# Patient Record
Sex: FEMALE | Race: BLACK OR AFRICAN AMERICAN | NOT HISPANIC OR LATINO | Employment: OTHER
[De-identification: names, ages, dates, MRNs, and addresses within clinical notes are randomized per-mention and may not be internally consistent; named-entity substitution may affect disease eponyms.]

---

## 2017-09-17 ENCOUNTER — HEALTH MAINTENANCE LETTER (OUTPATIENT)
Age: 27
End: 2017-09-17

## 2022-04-28 ENCOUNTER — TRANSFERRED RECORDS (OUTPATIENT)
Dept: MULTI SPECIALTY CLINIC | Facility: CLINIC | Age: 32
End: 2022-04-28

## 2022-04-28 LAB
HEP C HIM: NORMAL
HIV 1&2 EXT: NORMAL

## 2023-04-17 ENCOUNTER — TRANSFERRED RECORDS (OUTPATIENT)
Dept: MULTI SPECIALTY CLINIC | Facility: CLINIC | Age: 33
End: 2023-04-17

## 2023-04-17 LAB
HPV ABSTRACT: NORMAL
PAP-ABSTRACT: NORMAL

## 2023-04-19 ENCOUNTER — APPOINTMENT (OUTPATIENT)
Dept: GENERAL RADIOLOGY | Facility: CLINIC | Age: 33
End: 2023-04-19
Attending: EMERGENCY MEDICINE
Payer: COMMERCIAL

## 2023-04-19 ENCOUNTER — HOSPITAL ENCOUNTER (EMERGENCY)
Facility: CLINIC | Age: 33
Discharge: HOME | End: 2023-04-19
Attending: EMERGENCY MEDICINE | Admitting: EMERGENCY MEDICINE
Payer: COMMERCIAL

## 2023-04-19 VITALS
DIASTOLIC BLOOD PRESSURE: 63 MMHG | SYSTOLIC BLOOD PRESSURE: 120 MMHG | TEMPERATURE: 97.7 F | RESPIRATION RATE: 18 BRPM | HEART RATE: 85 BPM | OXYGEN SATURATION: 100 %

## 2023-04-19 DIAGNOSIS — R05.9 COUGH, UNSPECIFIED TYPE: ICD-10-CM

## 2023-04-19 LAB
ANION GAP SERPL CALCULATED.3IONS-SCNC: 12 MMOL/L (ref 7–15)
ATRIAL RATE - MUSE: 67 BPM
BASOPHILS # BLD AUTO: 0 10E3/UL (ref 0–0.2)
BASOPHILS NFR BLD AUTO: 0 %
BUN SERPL-MCNC: 9.7 MG/DL (ref 6–20)
CALCIUM SERPL-MCNC: 9.1 MG/DL (ref 8.6–10)
CHLORIDE SERPL-SCNC: 100 MMOL/L (ref 98–107)
CREAT SERPL-MCNC: 0.42 MG/DL (ref 0.51–0.95)
D DIMER PPP FEU-MCNC: <0.27 UG/ML FEU (ref 0–0.5)
DEPRECATED HCO3 PLAS-SCNC: 23 MMOL/L (ref 22–29)
DIASTOLIC BLOOD PRESSURE - MUSE: NORMAL MMHG
EOSINOPHIL # BLD AUTO: 0 10E3/UL (ref 0–0.7)
EOSINOPHIL NFR BLD AUTO: 0 %
ERYTHROCYTE [DISTWIDTH] IN BLOOD BY AUTOMATED COUNT: 14.1 % (ref 10–15)
FLUAV RNA SPEC QL NAA+PROBE: NEGATIVE
FLUBV RNA RESP QL NAA+PROBE: NEGATIVE
GFR SERPL CREATININE-BSD FRML MDRD: >90 ML/MIN/1.73M2
GLUCOSE SERPL-MCNC: 104 MG/DL (ref 70–99)
HCG INTACT+B SERPL-ACNC: <1 MIU/ML
HCT VFR BLD AUTO: 40.4 % (ref 35–47)
HGB BLD-MCNC: 13.2 G/DL (ref 11.7–15.7)
IMM GRANULOCYTES # BLD: 0.1 10E3/UL
IMM GRANULOCYTES NFR BLD: 1 %
INTERPRETATION ECG - MUSE: NORMAL
LYMPHOCYTES # BLD AUTO: 4.2 10E3/UL (ref 0.8–5.3)
LYMPHOCYTES NFR BLD AUTO: 36 %
MCH RBC QN AUTO: 29.8 PG (ref 26.5–33)
MCHC RBC AUTO-ENTMCNC: 32.7 G/DL (ref 31.5–36.5)
MCV RBC AUTO: 91 FL (ref 78–100)
MONOCYTES # BLD AUTO: 0.5 10E3/UL (ref 0–1.3)
MONOCYTES NFR BLD AUTO: 5 %
NEUTROPHILS # BLD AUTO: 6.7 10E3/UL (ref 1.6–8.3)
NEUTROPHILS NFR BLD AUTO: 58 %
NRBC # BLD AUTO: 0 10E3/UL
NRBC BLD AUTO-RTO: 0 /100
P AXIS - MUSE: 45 DEGREES
PLASMODIUM AG BLD QL IA: NEGATIVE
PLASMODIUM AG BLD QL IA: NEGATIVE
PLATELET # BLD AUTO: 262 10E3/UL (ref 150–450)
POTASSIUM SERPL-SCNC: 4.4 MMOL/L (ref 3.4–5.3)
PR INTERVAL - MUSE: 198 MS
QRS DURATION - MUSE: 80 MS
QT - MUSE: 390 MS
QTC - MUSE: 412 MS
R AXIS - MUSE: 41 DEGREES
RBC # BLD AUTO: 4.43 10E6/UL (ref 3.8–5.2)
RSV RNA SPEC NAA+PROBE: NEGATIVE
SARS-COV-2 RNA RESP QL NAA+PROBE: NEGATIVE
SODIUM SERPL-SCNC: 135 MMOL/L (ref 136–145)
SYSTOLIC BLOOD PRESSURE - MUSE: NORMAL MMHG
T AXIS - MUSE: 40 DEGREES
TROPONIN T SERPL HS-MCNC: <6 NG/L
VENTRICULAR RATE- MUSE: 67 BPM
WBC # BLD AUTO: 11.6 10E3/UL (ref 4–11)

## 2023-04-19 PROCEDURE — 85379 FIBRIN DEGRADATION QUANT: CPT | Performed by: EMERGENCY MEDICINE

## 2023-04-19 PROCEDURE — 85025 COMPLETE CBC W/AUTO DIFF WBC: CPT | Performed by: EMERGENCY MEDICINE

## 2023-04-19 PROCEDURE — C9803 HOPD COVID-19 SPEC COLLECT: HCPCS | Performed by: EMERGENCY MEDICINE

## 2023-04-19 PROCEDURE — 99284 EMERGENCY DEPT VISIT MOD MDM: CPT | Mod: CS | Performed by: EMERGENCY MEDICINE

## 2023-04-19 PROCEDURE — 80048 BASIC METABOLIC PNL TOTAL CA: CPT | Performed by: EMERGENCY MEDICINE

## 2023-04-19 PROCEDURE — 99285 EMERGENCY DEPT VISIT HI MDM: CPT | Mod: CS,25 | Performed by: EMERGENCY MEDICINE

## 2023-04-19 PROCEDURE — 87899 AGENT NOS ASSAY W/OPTIC: CPT | Performed by: EMERGENCY MEDICINE

## 2023-04-19 PROCEDURE — 84702 CHORIONIC GONADOTROPIN TEST: CPT | Performed by: EMERGENCY MEDICINE

## 2023-04-19 PROCEDURE — 93010 ELECTROCARDIOGRAM REPORT: CPT | Performed by: EMERGENCY MEDICINE

## 2023-04-19 PROCEDURE — 36415 COLL VENOUS BLD VENIPUNCTURE: CPT | Performed by: EMERGENCY MEDICINE

## 2023-04-19 PROCEDURE — 93005 ELECTROCARDIOGRAM TRACING: CPT | Performed by: EMERGENCY MEDICINE

## 2023-04-19 PROCEDURE — 87207 SMEAR SPECIAL STAIN: CPT | Performed by: EMERGENCY MEDICINE

## 2023-04-19 PROCEDURE — 84484 ASSAY OF TROPONIN QUANT: CPT | Performed by: EMERGENCY MEDICINE

## 2023-04-19 PROCEDURE — 71046 X-RAY EXAM CHEST 2 VIEWS: CPT

## 2023-04-19 PROCEDURE — 87015 SPECIMEN INFECT AGNT CONCNTJ: CPT | Performed by: EMERGENCY MEDICINE

## 2023-04-19 PROCEDURE — 87637 SARSCOV2&INF A&B&RSV AMP PRB: CPT | Performed by: EMERGENCY MEDICINE

## 2023-04-19 RX ORDER — IBUPROFEN 600 MG/1
600 TABLET, FILM COATED ORAL EVERY 6 HOURS PRN
Qty: 30 TABLET | Refills: 0 | Status: SHIPPED | OUTPATIENT
Start: 2023-04-19 | End: 2023-08-11

## 2023-04-19 RX ORDER — BENZONATATE 200 MG/1
200 CAPSULE ORAL 3 TIMES DAILY PRN
Qty: 20 CAPSULE | Refills: 0 | Status: SHIPPED | OUTPATIENT
Start: 2023-04-19 | End: 2023-08-11 | Stop reason: ALTCHOICE

## 2023-04-19 ASSESSMENT — ACTIVITIES OF DAILY LIVING (ADL)
ADLS_ACUITY_SCORE: 35
ADLS_ACUITY_SCORE: 35

## 2023-04-19 NOTE — ED TRIAGE NOTES
Triage Assessment     Row Name 04/19/23 0030       Triage Assessment (Adult)    Airway WDL X       Respiratory WDL    Respiratory WDL X;cough;all    Rhythm/Pattern, Respiratory shortness of breath    Cough Frequency frequent       Skin Circulation/Temperature WDL    Skin Circulation/Temperature WDL WDL       Cardiac WDL    Cardiac WDL chest pain       Chest Pain Assessment    Chest Pain Location epigastric       Peripheral/Neurovascular WDL    Peripheral Neurovascular WDL WDL       Cognitive/Neuro/Behavioral WDL    Cognitive/Neuro/Behavioral WDL WDL

## 2023-04-19 NOTE — ED NOTES
Pt given discharge paperwork and instructions. No questions or concerns at this time. Ambulatory with steady gait. VSS. A&O x4

## 2023-04-19 NOTE — ED PROVIDER NOTES
ED Provider Note  [facility]      History     Chief Complaint   Patient presents with     Chest Pain     Pt is having 9/10 chest pain that been going on for 3  weeks gotten worse this week.       Cough     Fever     HPI:  Chrissy Hernandez is a 33 year old female with PMH including PE not on AC and mild chronic anemia who presents with worsening cough, chest pain, and shortness of breath. She states that her symptoms started while she was in [place] (-3/15), she thinks she contracted something while there. She did have mosquitos bites while there. Since then, has had worsening non-productive cough and associated chest pain and shortness of breath over the past 3-5 days. Her chest pain is generalized, sharp, constant and radiates to back, sides, and epigastric area. Worsens with coughing. Her shortness of breath is when she is coughing. Was seen in clinic on , tested negative for COVID and GAS at that time and was started on Albuterol. Again seen in clinic on  and was started on Symbicort. Notes she was prescribed a course of antibiotics (unsure name) starting on the . Her symptoms have not improved with the inhalers or antibiotics. Appetite has been low. Endorses subjective fevers, chills, pharyngitis, congestion, rhinorrhea, myalgias, HA, vomiting. Denies sweats, hemoptysis, diarrhea, constipation, dysuria, peripheral edema, rashes, eye pain, arthralgias. No known sick contacts.    Past Medical History  Pulmonary embolism (2018)  Mild chronic anemia   MDD    Past Surgical History    (2021)    Medications  Albuterol  Symbicort     No Known Allergies     Family History  No family history on file.     Social History   Non-smoker     A medically appropriate review of systems was performed with pertinent positives and negatives noted in the HPI, and all other systems negative.    Physical Exam   BP: 107/75  Pulse: 72  Temp: 98.4  F (36.9  C)  Resp: 18  SpO2: 100 %      Physical Exam  Constitutional:       Appearance: She is not diaphoretic.      Comments: Intermittently sleeping    HENT:      Head: Normocephalic and atraumatic.   Eyes:      Conjunctiva/sclera: Conjunctivae normal.      Pupils: Pupils are equal, round, and reactive to light.   Cardiovascular:      Rate and Rhythm: Normal rate and regular rhythm.      Heart sounds: Normal heart sounds. No murmur heard.  Pulmonary:      Effort: Pulmonary effort is normal. No respiratory distress.      Breath sounds: Normal breath sounds. No wheezing or rhonchi.   Abdominal:      General: Bowel sounds are normal. There is no distension.      Palpations: Abdomen is soft.      Tenderness: There is no abdominal tenderness.   Musculoskeletal:         General: No swelling or tenderness.      Cervical back: Neck supple. No tenderness.   Lymphadenopathy:      Cervical: No cervical adenopathy.   Skin:     Findings: No lesion or rash.   Neurological:      General: No focal deficit present.   Psychiatric:         Mood and Affect: Mood normal.         Behavior: Behavior normal.       ED Course, Procedures, & Data     Procedures            Results for orders placed or performed during the hospital encounter of 04/19/23   XR Chest 2 Views     Status: None    Narrative    EXAM: XR CHEST 2 VIEWS  LOCATION: [facility]  DATE/TIME: 4/19/2023 2:14 AM CDT    INDICATION: cough  COMPARISON: None.      Impression    IMPRESSION: Negative chest.   Basic metabolic panel     Status: Abnormal   Result Value Ref Range    Sodium 135 (L) 136 - 145 mmol/L    Potassium 4.4 3.4 - 5.3 mmol/L    Chloride 100 98 - 107 mmol/L    Carbon Dioxide (CO2) 23 22 - 29 mmol/L    Anion Gap 12 7 - 15 mmol/L    Urea Nitrogen 9.7 6.0 - 20.0 mg/dL    Creatinine 0.42 (L) 0.51 - 0.95 mg/dL    Calcium 9.1 8.6 - 10.0 mg/dL    Glucose 104 (H) 70 - 99 mg/dL    GFR Estimate >90 >60 mL/min/1.73m2   Symptomatic Influenza A/B, RSV, & SARS-CoV2  PCR (COVID-19) Nose     Status: Normal    Specimen: Nose; Swab   Result Value Ref Range    Influenza A PCR Negative Negative    Influenza B PCR Negative Negative    RSV PCR Negative Negative    SARS CoV2 PCR Negative Negative    Narrative    Testing was performed using the Xpert Xpress CoV2/Flu/RSV Assay on the [employer] GeneXpert Instrument. This test should be ordered for the detection of SARS-CoV-2, influenza, and RSV viruses in individuals who meet clinical and/or epidemiological criteria. Test performance is unknown in asymptomatic patients. This test is for in vitro diagnostic use under the FDA EUA for laboratories certified under CLIA to perform high or moderate complexity testing. This test has not been FDA cleared or approved. A negative result does not rule out the presence of PCR inhibitors in the specimen or target RNA in concentration below the limit of detection for the assay. If only one viral target is positive but coinfection with multiple targets is suspected, the sample should be re-tested with another FDA cleared, approved, or authorized test, if coinfection would change clinical management. This test was validated by the [facility]. These laboratories are certified under the Clinical Laboratory Improvement Amendments of 1988 (CLIA-88) as qualified to perform high complexity laboratory testing.   Troponin T, High Sensitivity     Status: Normal   Result Value Ref Range    Troponin T, High Sensitivity <6 <=14 ng/L   D dimer quantitative     Status: Normal   Result Value Ref Range    D-Dimer Quantitative <0.27 0.00 - 0.50 ug/mL FEU    Narrative    This D-dimer assay is intended for use in conjunction with a clinical pretest probability assessment model to exclude pulmonary embolism (PE) and deep venous thrombosis (DVT) in outpatients suspected of PE or DVT. The cut-off value is 0.50 ug/mL FEU.   HCG quantitative pregnancy     Status: Normal   Result Value Ref Range    hCG  Quantitative <1 <5 mIU/mL   CBC with platelets and differential     Status: Abnormal   Result Value Ref Range    WBC Count 11.6 (H) 4.0 - 11.0 10e3/uL    RBC Count 4.43 3.80 - 5.20 10e6/uL    Hemoglobin 13.2 11.7 - 15.7 g/dL    Hematocrit 40.4 35.0 - 47.0 %    MCV 91 78 - 100 fL    MCH 29.8 26.5 - 33.0 pg    MCHC 32.7 31.5 - 36.5 g/dL    RDW 14.1 10.0 - 15.0 %    Platelet Count 262 150 - 450 10e3/uL    % Neutrophils 58 %    % Lymphocytes 36 %    % Monocytes 5 %    % Eosinophils 0 %    % Basophils 0 %    % Immature Granulocytes 1 %    NRBCs per 100 WBC 0 <1 /100    Absolute Neutrophils 6.7 1.6 - 8.3 10e3/uL    Absolute Lymphocytes 4.2 0.8 - 5.3 10e3/uL    Absolute Monocytes 0.5 0.0 - 1.3 10e3/uL    Absolute Eosinophils 0.0 0.0 - 0.7 10e3/uL    Absolute Basophils 0.0 0.0 - 0.2 10e3/uL    Absolute Immature Granulocytes 0.1 <=0.4 10e3/uL    Absolute NRBCs 0.0 10e3/uL   EKG 12 lead     Status: None (Preliminary result)   Result Value Ref Range    Systolic Blood Pressure  mmHg    Diastolic Blood Pressure  mmHg    Ventricular Rate 67 BPM    Atrial Rate 67 BPM    GA Interval 198 ms    QRS Duration 80 ms     ms    QTc 412 ms    P Axis 45 degrees    R AXIS 41 degrees    T Axis 40 degrees    Interpretation ECG Sinus rhythm  Normal ECG      CBC with platelets differential     Status: Abnormal    Narrative    The following orders were created for panel order CBC with platelets differential.  Procedure                               Abnormality         Status                     ---------                               -----------         ------                     CBC with platelets and d...[[id number]]  Abnormal            Final result                 Please view results for these tests on the individual orders.     Medications - No data to display         Assessment & Plan    Chrissy Hernandez is a 33 year old female with PMH including PE not on AC and mild chronic anemia who presents with about 1 month of worsening cough,  subjective fevers, and generally feeling ill now associated with chest pain and shortness of breath for past 5 days. Vitals are stable, she is afebrile and satting appropriately on RA. Clinically, she appears ill and has not had improvement in her symptoms with bronchodilators or antibiotics. Differential includes malaria, arbovirus infection, viral URI, pneumonia, PE, ACS. Given her travel history and symptoms, am highly suspicious for malaria or other mosquito-borne infection. This could be viral URI, however would expect her symptoms to have started improving vs worsening by now. Also could have a pneumonia, however less likely given non-productive cough and normal lung exam. Low concern for PE at this time, given not hypoxic though will rule out given history and dyspnea. Also low concern for ACS given mostly subacute infectious symptoms and chest pain seems to be related to cough.     Work-up in ED included labs notable for normal D-dimer so do not suspect pulmonary embolism.  Normal EKG and troponins do not suspect ACS., CXR which was unremarkable for consolidation or infiltrates, and EKG which did not show evidence of acute ischemic changes.  COVID and influenza testing negative.  Malaria screen pending.    --    ED Attending Physician Attestation    I RANDAL ROWLEY MD, MD, cared for this patient with the Medical Student. I performed, or re-performed, the physical exam and medical decision-making. I have verified the accuracy of all the medical student findings and documentation above, and have edited as necessary.    Summary of HPI, PE, ED Course   Patient is a 33 year old female evaluated in the emergency department for 3-week history of cough now 5 days of chest pain . Exam and ED course notable for normal vital signs and exam.  Diagnostic evaluation with mild leukocytosis but no infiltrate on chest radiograph so do not suspect pneumonia.  COVID and influenza testing negative.  D-dimer normal so do not  suspect pulmonary embolism.  EKG and troponin normal so not suspect ACS.  Malaria screen pending.  After the completion of care in the emergency department, the patient was discharged.    Critical Care & Procedures  Not applicable.                DANIELE ROWLEY MD, MD  Emergency Medicine      Medical Decision Making  The patient's presentation was of moderate complexity (an undiagnosed new problem with uncertain diagnosis).    The patient's evaluation involved:  review of external note(s) from 2 sources (Recent urgent care and primary care visits)  ordering and/or review of 3+ test(s) in this encounter (see separate area of note for details)  review of 1 test result(s) ordered prior to this encounter (Recent COVID test result)    The patient's management necessitated moderate risk (prescription drug management including medications given in the ED).         I have reviewed the nursing notes. I have reviewed the findings, diagnosis, plan and need for follow up with the patient.    New Prescriptions    BENZONATATE (TESSALON) 200 MG CAPSULE    Take 1 capsule (200 mg) by mouth 3 times daily as needed for cough    IBUPROFEN (ADVIL/MOTRIN) 600 MG TABLET    Take 1 tablet (600 mg) by mouth every 6 hours as needed for moderate pain       Final diagnoses:   Cough, unspecified type       Elvira Mon, MS3     Daniele Rowley Md  [facility] EMERGENCY DEPARTMENT  4/19/2023     Daniele Rowley MD  04/19/23 0412

## 2023-04-19 NOTE — ED TRIAGE NOTES
Pt here for a chest pain,shortness of breath, cough, and fever that has been going on for about  three weeks but gotten worse this week. Pt was seen in a another hospital last week. Pt states she was given inhalers but was not helping. Pt states she was tested for Covid three days ago and was negative.

## 2023-04-19 NOTE — DISCHARGE INSTRUCTIONS
Take Tessalon as directed for cough.  Take ibuprofen 600 mg every 6 hours with food as needed for pain.  You may also take doses of acetaminophen in between doses of ibuprofen.  Drink plenty of fluids.    Follow-up with your primary care clinic for recheck and for malaria test results.    Return if worse or other concerns.

## 2023-04-23 LAB — MALARIA SMEAR BLD: NEGATIVE

## 2023-07-26 PROCEDURE — 81025 URINE PREGNANCY TEST: CPT | Performed by: EMERGENCY MEDICINE

## 2023-07-26 PROCEDURE — 99283 EMERGENCY DEPT VISIT LOW MDM: CPT

## 2023-07-26 PROCEDURE — 99284 EMERGENCY DEPT VISIT MOD MDM: CPT | Performed by: EMERGENCY MEDICINE

## 2023-07-27 ENCOUNTER — HOSPITAL ENCOUNTER (EMERGENCY)
Facility: CLINIC | Age: 33
Discharge: HOME | End: 2023-07-27
Attending: EMERGENCY MEDICINE | Admitting: EMERGENCY MEDICINE
Payer: COMMERCIAL

## 2023-07-27 VITALS
OXYGEN SATURATION: 99 % | DIASTOLIC BLOOD PRESSURE: 68 MMHG | HEART RATE: 77 BPM | RESPIRATION RATE: 16 BRPM | TEMPERATURE: 98.2 F | SYSTOLIC BLOOD PRESSURE: 104 MMHG

## 2023-07-27 DIAGNOSIS — R20.2 PARESTHESIAS: ICD-10-CM

## 2023-07-27 DIAGNOSIS — M79.89 SWELLING OF LIMB: Primary | ICD-10-CM

## 2023-07-27 DIAGNOSIS — Z11.52 ENCOUNTER FOR SCREENING LABORATORY TESTING FOR SEVERE ACUTE RESPIRATORY SYNDROME CORONAVIRUS 2 (SARS-COV-2): ICD-10-CM

## 2023-07-27 LAB
ALBUMIN SERPL BCG-MCNC: 3.6 G/DL (ref 3.5–5.2)
ALP SERPL-CCNC: 65 U/L (ref 35–104)
ALT SERPL W P-5'-P-CCNC: 10 U/L (ref 0–50)
ANION GAP SERPL CALCULATED.3IONS-SCNC: 10 MMOL/L (ref 7–15)
AST SERPL W P-5'-P-CCNC: 17 U/L (ref 0–45)
BASOPHILS # BLD AUTO: 0 10E3/UL (ref 0–0.2)
BASOPHILS NFR BLD AUTO: 0 %
BILIRUB SERPL-MCNC: 0.2 MG/DL
BUN SERPL-MCNC: 8 MG/DL (ref 6–20)
CALCIUM SERPL-MCNC: 8.9 MG/DL (ref 8.6–10)
CHLORIDE SERPL-SCNC: 106 MMOL/L (ref 98–107)
CREAT SERPL-MCNC: 0.45 MG/DL (ref 0.51–0.95)
CRP SERPL-MCNC: 6.98 MG/L
DEPRECATED HCO3 PLAS-SCNC: 23 MMOL/L (ref 22–29)
EOSINOPHIL # BLD AUTO: 0 10E3/UL (ref 0–0.7)
EOSINOPHIL NFR BLD AUTO: 0 %
ERYTHROCYTE [DISTWIDTH] IN BLOOD BY AUTOMATED COUNT: 13.2 % (ref 10–15)
GFR SERPL CREATININE-BSD FRML MDRD: >90 ML/MIN/1.73M2
GLUCOSE SERPL-MCNC: 95 MG/DL (ref 70–99)
HCG SERPL QL: NEGATIVE
HCG UR QL: NEGATIVE
HCT VFR BLD AUTO: 35.6 % (ref 35–47)
HGB BLD-MCNC: 11.4 G/DL (ref 11.7–15.7)
IMM GRANULOCYTES # BLD: 0 10E3/UL
IMM GRANULOCYTES NFR BLD: 0 %
INTERNAL QC OK POCT: NORMAL
LYMPHOCYTES # BLD AUTO: 2.6 10E3/UL (ref 0.8–5.3)
LYMPHOCYTES NFR BLD AUTO: 36 %
MAGNESIUM SERPL-MCNC: 2 MG/DL (ref 1.7–2.3)
MCH RBC QN AUTO: 28.8 PG (ref 26.5–33)
MCHC RBC AUTO-ENTMCNC: 32 G/DL (ref 31.5–36.5)
MCV RBC AUTO: 90 FL (ref 78–100)
MONOCYTES # BLD AUTO: 0.6 10E3/UL (ref 0–1.3)
MONOCYTES NFR BLD AUTO: 9 %
NEUTROPHILS # BLD AUTO: 3.9 10E3/UL (ref 1.6–8.3)
NEUTROPHILS NFR BLD AUTO: 55 %
NRBC # BLD AUTO: 0 10E3/UL
NRBC BLD AUTO-RTO: 0 /100
PLATELET # BLD AUTO: 243 10E3/UL (ref 150–450)
POCT KIT EXPIRATION DATE: NORMAL
POCT KIT LOT NUMBER: NORMAL
POTASSIUM SERPL-SCNC: 4 MMOL/L (ref 3.4–5.3)
PROT SERPL-MCNC: 6.8 G/DL (ref 6.4–8.3)
RBC # BLD AUTO: 3.96 10E6/UL (ref 3.8–5.2)
SARS-COV-2 RNA RESP QL NAA+PROBE: NEGATIVE
SODIUM SERPL-SCNC: 139 MMOL/L (ref 136–145)
WBC # BLD AUTO: 7.2 10E3/UL (ref 4–11)

## 2023-07-27 PROCEDURE — 85025 COMPLETE CBC W/AUTO DIFF WBC: CPT | Performed by: EMERGENCY MEDICINE

## 2023-07-27 PROCEDURE — 86140 C-REACTIVE PROTEIN: CPT | Performed by: EMERGENCY MEDICINE

## 2023-07-27 PROCEDURE — 84703 CHORIONIC GONADOTROPIN ASSAY: CPT | Performed by: EMERGENCY MEDICINE

## 2023-07-27 PROCEDURE — 36415 COLL VENOUS BLD VENIPUNCTURE: CPT | Performed by: EMERGENCY MEDICINE

## 2023-07-27 PROCEDURE — 87635 SARS-COV-2 COVID-19 AMP PRB: CPT | Performed by: EMERGENCY MEDICINE

## 2023-07-27 PROCEDURE — 80053 COMPREHEN METABOLIC PANEL: CPT | Performed by: EMERGENCY MEDICINE

## 2023-07-27 PROCEDURE — 83735 ASSAY OF MAGNESIUM: CPT | Performed by: EMERGENCY MEDICINE

## 2023-07-27 ASSESSMENT — ACTIVITIES OF DAILY LIVING (ADL)
ADLS_ACUITY_SCORE: 35
ADLS_ACUITY_SCORE: 33

## 2023-07-27 NOTE — DISCHARGE INSTRUCTIONS
Follow up with a clinic doctor within a week. Return with any new or worsening symptoms or any other concerns.

## 2023-07-27 NOTE — ED PROVIDER NOTES
ED Provider Note  [facility]      History     Chief Complaint   Patient presents with    Numbness     BLE, BUE numbness for about 1 week. Mild swelling B feet    Dry Eye(s)     Dry eyes and dry mouth started today     HPI  Chrissy Hernandez is a 33 year old female who presents to the ED with multiple complaints.  She states that she is having intermittent numbness in her bilateral arms, from the shoulder down to the fingers, as well as bilateral legs from the knee down to the toes.  She denies any numbness in the thighs or in her trunk.  She states this comes and goes, and when it comes it can last an hour.  She is not sure what brings it on.  She denies tingling.  She denies trauma.  No headache no reported neck or back pain.  No focal weakness, though she states she generally feels weak.  No blurred vision, slurred speech.  No stuffy nose or sore throat.  She has had a little bit of a cough for a couple of days.  No shortness of breath, abdominal pain, nausea, vomiting, diarrhea, dysuria, rash.  She states that additionally her mouth and eyes feel dry to her today, and she thinks she might have a slight bit of swelling in her bilateral feet.    Past Medical History  History reviewed. No pertinent past medical history.  History reviewed. No pertinent surgical history.  benzonatate (TESSALON) 200 MG capsule  ibuprofen (ADVIL/MOTRIN) 600 MG tablet      Allergies   Allergen Reactions    Egg Solids, Whole Itching     Family History  History reviewed. No pertinent family history.  Social History   Social History     Tobacco Use    Smoking status: Never    Smokeless tobacco: Never   Substance Use Topics    Alcohol use: Never    Drug use: Never         A medically appropriate review of systems was performed with pertinent positives and negatives noted in the HPI, and all other systems negative.    Physical Exam   BP: 107/62  Pulse: 87  Temp: 98.4  F (36.9  C)  Resp: 16  SpO2: 100 %  Physical  Exam  Constitutional:       General: She is not in acute distress.     Appearance: Normal appearance. She is not toxic-appearing.   HENT:      Head: Atraumatic.      Mouth/Throat:      Mouth: Mucous membranes are moist.   Eyes:      General: No scleral icterus.     Conjunctiva/sclera: Conjunctivae normal.   Cardiovascular:      Rate and Rhythm: Normal rate.      Heart sounds: Normal heart sounds.   Pulmonary:      Effort: No respiratory distress.      Breath sounds: Normal breath sounds.   Abdominal:      Palpations: Abdomen is soft.      Tenderness: There is no abdominal tenderness.   Musculoskeletal:         General: No deformity.      Cervical back: Neck supple.      Right lower leg: No edema.      Left lower leg: No edema.   Skin:     General: Skin is warm.      Findings: No rash.   Neurological:      General: No focal deficit present.      Mental Status: She is alert and oriented to person, place, and time.      Cranial Nerves: No cranial nerve deficit.      Sensory: No sensory deficit.      Motor: No weakness.      Coordination: Coordination normal.           ED Course, Procedures, & Data      Procedures                     Results for orders placed or performed during the hospital encounter of 07/27/23   Comprehensive metabolic panel     Status: Abnormal   Result Value Ref Range    Sodium 139 136 - 145 mmol/L    Potassium 4.0 3.4 - 5.3 mmol/L    Chloride 106 98 - 107 mmol/L    Carbon Dioxide (CO2) 23 22 - 29 mmol/L    Anion Gap 10 7 - 15 mmol/L    Urea Nitrogen 8.0 6.0 - 20.0 mg/dL    Creatinine 0.45 (L) 0.51 - 0.95 mg/dL    Calcium 8.9 8.6 - 10.0 mg/dL    Glucose 95 70 - 99 mg/dL    Alkaline Phosphatase 65 35 - 104 U/L    AST 17 0 - 45 U/L    ALT 10 0 - 50 U/L    Protein Total 6.8 6.4 - 8.3 g/dL    Albumin 3.6 3.5 - 5.2 g/dL    Bilirubin Total 0.2 <=1.2 mg/dL    GFR Estimate >90 >60 mL/min/1.73m2   Magnesium     Status: Normal   Result Value Ref Range    Magnesium 2.0 1.7 - 2.3 mg/dL   HCG qualitative  pregnancy (blood)     Status: Normal   Result Value Ref Range    hCG Serum Qualitative Negative Negative   CRP inflammation     Status: Abnormal   Result Value Ref Range    CRP Inflammation 6.98 (H) <5.00 mg/L   Symptomatic COVID-19 Virus (Coronavirus) by PCR Nasopharyngeal     Status: Normal    Specimen: Nasopharyngeal; Swab   Result Value Ref Range    SARS CoV2 PCR Negative Negative    Narrative    Testing was performed using the Xpert Xpress SARS-CoV-2 Assay on the [employer] Gene[employer]Xpert [employer] Systems. Additional information about this Emergency Use Authorization (EUA) assay can be found via the Lab Guide. This test should be ordered for the detection of SARS-CoV-2 in individuals who meet SARS-CoV-2 clinical and/or epidemiological criteria as well as from individuals without symptoms or other reasons to suspect COVID-19. Test performance for asymptomatic patients has only been established in anterior nasal swab specimens. This test is for in vitro diagnostic use under the FDA EUA for laboratories certified under CLIA to perform high complexity testing. This test has not been FDA cleared or approved. A negative result does not rule out the presence of PCR inhibitors in the specimen or target RNA concentration below the limit of detection for the assay. The possibility of a false negative should be considered if the patient's recent exposure or clinical presentation suggests COVID-19. This test was validated by the [facility]. This laboratory is certified under the Clinical Laboratory Improvement Amendments (CLIA) as qualified to perform high complexity laboratory testing.     CBC with platelets and differential     Status: Abnormal   Result Value Ref Range    WBC Count 7.2 4.0 - 11.0 10e3/uL    RBC Count 3.96 3.80 - 5.20 10e6/uL    Hemoglobin 11.4 (L) 11.7 - 15.7 g/dL    Hematocrit 35.6 35.0 - 47.0 %    MCV 90 78 - 100 fL    MCH 28.8 26.5 - 33.0 pg    MCHC 32.0 31.5 - 36.5  g/dL    RDW 13.2 10.0 - 15.0 %    Platelet Count 243 150 - 450 10e3/uL    % Neutrophils 55 %    % Lymphocytes 36 %    % Monocytes 9 %    % Eosinophils 0 %    % Basophils 0 %    % Immature Granulocytes 0 %    NRBCs per 100 WBC 0 <1 /100    Absolute Neutrophils 3.9 1.6 - 8.3 10e3/uL    Absolute Lymphocytes 2.6 0.8 - 5.3 10e3/uL    Absolute Monocytes 0.6 0.0 - 1.3 10e3/uL    Absolute Eosinophils 0.0 0.0 - 0.7 10e3/uL    Absolute Basophils 0.0 0.0 - 0.2 10e3/uL    Absolute Immature Granulocytes 0.0 <=0.4 10e3/uL    Absolute NRBCs 0.0 10e3/uL   hCG qual urine POCT     Status: Normal   Result Value Ref Range    HCG Qual Urine Negative Negative    Internal QC Check POCT Valid Valid    POCT Kit Lot Number [id number]     POCT Kit Expiration Date 9/30/24    CBC with platelets differential     Status: Abnormal    Narrative    The following orders were created for panel order CBC with platelets differential.  Procedure                               Abnormality         Status                     ---------                               -----------         ------                     CBC with platelets and d...[[id number]]  Abnormal            Final result                 Please view results for these tests on the individual orders.     Medications - No data to display  Labs Ordered and Resulted from Time of ED Arrival to Time of ED Departure   COMPREHENSIVE METABOLIC PANEL - Abnormal       Result Value    Sodium 139      Potassium 4.0      Chloride 106      Carbon Dioxide (CO2) 23      Anion Gap 10      Urea Nitrogen 8.0      Creatinine 0.45 (*)     Calcium 8.9      Glucose 95      Alkaline Phosphatase 65      AST 17      ALT 10      Protein Total 6.8      Albumin 3.6      Bilirubin Total 0.2      GFR Estimate >90     CRP INFLAMMATION - Abnormal    CRP Inflammation 6.98 (*)    CBC WITH PLATELETS AND DIFFERENTIAL - Abnormal    WBC Count 7.2      RBC Count 3.96      Hemoglobin 11.4 (*)     Hematocrit 35.6      MCV 90      MCH 28.8       MCHC 32.0      RDW 13.2      Platelet Count 243      % Neutrophils 55      % Lymphocytes 36      % Monocytes 9      % Eosinophils 0      % Basophils 0      % Immature Granulocytes 0      NRBCs per 100 WBC 0      Absolute Neutrophils 3.9      Absolute Lymphocytes 2.6      Absolute Monocytes 0.6      Absolute Eosinophils 0.0      Absolute Basophils 0.0      Absolute Immature Granulocytes 0.0      Absolute NRBCs 0.0     MAGNESIUM - Normal    Magnesium 2.0     HCG QUALITATIVE PREGNANCY - Normal    hCG Serum Qualitative Negative     COVID-19 VIRUS (CORONAVIRUS) BY PCR - Normal    SARS CoV2 PCR Negative     HCG QUALITATIVE URINE POCT - Normal    HCG Qual Urine Negative      Internal QC Check POCT Valid      POCT Kit Lot Number [id number]      POCT Kit Expiration Date 9/30/24       No orders to display          Critical care was not performed.     Medical Decision Making  The patient's presentation was of moderate complexity (an undiagnosed new problem with uncertain diagnosis).    The patient's evaluation involved:  review of external note(s) from 1 sources (previous note)  ordering and/or review of 3+ test(s) in this encounter (see separate area of note for details)  review of 3+ test result(s) ordered prior to this encounter (previous results)    The patient's management necessitated only low risk treatment.    Assessment & Plan    The patient's neurologic exam is completely normal currently.  She reports that her sensation is equal to light touch bilaterally, and she states she feels though her sensation is normal with light touch testing.  Symptoms are intermittent, did not involve the thigh or the trunk bilaterally.  Given this, I am not concerned about a spinal cord injury/abnormality.  Given the bilateral nature, central cause is extremely unlikely.  Basic labs were checked, CRP is minimally elevated.  I did check this in light of the complaint of dry mouth and dry eyes.  Exam here is essentially completely normal.   The cause of her symptoms are not clear, but no emergent cause was identified.  Cannot exclude the possibility of a rheumatologic issue, underlying neurologic abnormality.  No clear infectious signs or symptoms, though atypical infection is a possibility.  I do want her to follow-up with primary care.  I did place a primary care referral. She is encouraged to return to the ER with any new or worsening symptoms, any other concerns.  She verbalizes understanding and is agreeable to the plan.    Dictation Disclaimer: Some of this Note has been completed with voice-recognition dictation software. Although errors are generally corrected real-time, there is the potential for a rare error to be present in the completed chart.      I have reviewed the nursing notes. I have reviewed the findings, diagnosis, plan and need for follow up with the patient.    Discharge Medication List as of 7/27/2023  2:59 AM          Final diagnoses:   Paresthesias       Simin Ji  [facility] EMERGENCY DEPARTMENT  7/26/2023     Simin Ji MD  07/27/23 0523

## 2023-07-28 ENCOUNTER — OFFICE VISIT (OUTPATIENT)
Dept: FAMILY MEDICINE | Facility: CLINIC | Age: 33
End: 2023-07-28
Payer: COMMERCIAL

## 2023-07-28 VITALS
HEIGHT: 65 IN | TEMPERATURE: 98.3 F | OXYGEN SATURATION: 100 % | RESPIRATION RATE: 24 BRPM | SYSTOLIC BLOOD PRESSURE: 120 MMHG | BODY MASS INDEX: 30.96 KG/M2 | HEART RATE: 74 BPM | DIASTOLIC BLOOD PRESSURE: 84 MMHG | WEIGHT: 185.8 LBS

## 2023-07-28 DIAGNOSIS — Z13.220 SCREENING FOR LIPID DISORDERS: ICD-10-CM

## 2023-07-28 DIAGNOSIS — M25.50 MULTIPLE JOINT PAIN: ICD-10-CM

## 2023-07-28 DIAGNOSIS — E61.1 IRON DEFICIENCY: ICD-10-CM

## 2023-07-28 DIAGNOSIS — Z12.83 SKIN CANCER SCREENING: ICD-10-CM

## 2023-07-28 DIAGNOSIS — E55.9 VITAMIN D DEFICIENCY: Chronic | ICD-10-CM

## 2023-07-28 DIAGNOSIS — R79.89 ABNORMAL THYROID STIMULATING HORMONE (TSH) LEVEL: ICD-10-CM

## 2023-07-28 DIAGNOSIS — R20.2 PARESTHESIAS: Primary | ICD-10-CM

## 2023-07-28 DIAGNOSIS — R06.02 SHORTNESS OF BREATH: ICD-10-CM

## 2023-07-28 DIAGNOSIS — R06.09 DOE (DYSPNEA ON EXERTION): ICD-10-CM

## 2023-07-28 PROBLEM — D64.9 MILD CHRONIC ANEMIA: Status: ACTIVE | Noted: 2018-11-07

## 2023-07-28 PROBLEM — Z86.711 HISTORY OF PULMONARY EMBOLUS (PE): Status: ACTIVE | Noted: 2019-10-19

## 2023-07-28 PROBLEM — D06.9 CARCINOMA IN SITU OF CERVIX: Status: ACTIVE | Noted: 2019-10-10

## 2023-07-28 PROBLEM — D06.9 CIN III (CERVICAL INTRAEPITHELIAL NEOPLASIA GRADE III) WITH SEVERE DYSPLASIA: Status: ACTIVE | Noted: 2019-10-10

## 2023-07-28 PROBLEM — N90.812: Status: ACTIVE | Noted: 2019-12-18

## 2023-07-28 PROBLEM — R87.612 LOW GRADE SQUAMOUS INTRAEPITHELIAL LESION (LGSIL) ON CERVICOVAGINAL CYTOLOGIC SMEAR: Status: ACTIVE | Noted: 2019-10-10

## 2023-07-28 LAB
CHOLEST SERPL-MCNC: 166 MG/DL
DEPRECATED CALCIDIOL+CALCIFEROL SERPL-MC: 21 UG/L (ref 20–75)
FERRITIN SERPL-MCNC: 19 NG/ML (ref 6–175)
HDLC SERPL-MCNC: 52 MG/DL
IRON BINDING CAPACITY (ROCHE): 378 UG/DL (ref 240–430)
IRON SATN MFR SERPL: 8 % (ref 15–46)
IRON SERPL-MCNC: 29 UG/DL (ref 37–145)
LDLC SERPL CALC-MCNC: 102 MG/DL
NONHDLC SERPL-MCNC: 114 MG/DL
TRIGL SERPL-MCNC: 59 MG/DL
TSH SERPL DL<=0.005 MIU/L-ACNC: 1.77 UIU/ML (ref 0.3–4.2)
VIT B12 SERPL-MCNC: 497 PG/ML (ref 232–1245)

## 2023-07-28 PROCEDURE — 83550 IRON BINDING TEST: CPT

## 2023-07-28 PROCEDURE — 83540 ASSAY OF IRON: CPT

## 2023-07-28 PROCEDURE — 36415 COLL VENOUS BLD VENIPUNCTURE: CPT

## 2023-07-28 PROCEDURE — 82607 VITAMIN B-12: CPT

## 2023-07-28 PROCEDURE — 82728 ASSAY OF FERRITIN: CPT

## 2023-07-28 PROCEDURE — 86038 ANTINUCLEAR ANTIBODIES: CPT

## 2023-07-28 PROCEDURE — 93000 ELECTROCARDIOGRAM COMPLETE: CPT

## 2023-07-28 PROCEDURE — 99204 OFFICE O/P NEW MOD 45 MIN: CPT

## 2023-07-28 PROCEDURE — 86431 RHEUMATOID FACTOR QUANT: CPT

## 2023-07-28 PROCEDURE — 80061 LIPID PANEL: CPT

## 2023-07-28 PROCEDURE — 82306 VITAMIN D 25 HYDROXY: CPT

## 2023-07-28 PROCEDURE — 84443 ASSAY THYROID STIM HORMONE: CPT

## 2023-07-28 RX ORDER — ERGOCALCIFEROL 1.25 MG/1
50000 CAPSULE, LIQUID FILLED ORAL WEEKLY
Qty: 8 CAPSULE | Refills: 0 | Status: SHIPPED | OUTPATIENT
Start: 2023-07-28 | End: 2023-11-21

## 2023-07-28 ASSESSMENT — PAIN SCALES - GENERAL: PAINLEVEL: SEVERE PAIN (7)

## 2023-07-28 NOTE — PROGRESS NOTES
"  Assessment & Plan     Chrissy was seen today for recheck medication and er f/u.    Diagnoses and all orders for this visit:    Paresthesias  Multiple joint pain  -     Primary Care Referral  -     Vitamin B12; Future  -     Anti Nuclear Keyla IgG by IFA with Reflex; Future  -     Rheumatoid factor; Future    BECK (dyspnea on exertion)  Shortness of breath  -     Iron and iron binding capacity; Future  -     Ferritin; Future  -     EKG 12-lead complete w/read - Clinics  -     Echocardiogram Complete; Future    Vitamin D deficiency  -     vitamin D2 (ERGOCALCIFEROL) 46487 units (1250 mcg) capsule; Take 1 capsule (50,000 Units) by mouth once a week  -     Vitamin D Deficiency; Future    Abnormal thyroid stimulating hormone (TSH) level  -     TSH with free T4 reflex; Future  -     Anti Nuclear Keyla IgG by IFA with Reflex; Future    Screening for lipid disorders  -     Lipid panel reflex to direct LDL Fasting; Future    Skin cancer screening  -     Adult Dermatology Referral; Future           Ordering of each unique test  Prescription drug management       MED REC REQUIRED  Post Medication Reconciliation Status:     BMI:   Estimated body mass index is 30.92 kg/m  as calculated from the following:    Height as of this encounter: 1.651 m (5' 5\").    Weight as of this encounter: 84.3 kg (185 lb 12.8 oz).       See Patient Instructions    KENROY Lau [facility]   Chrissy is a 33 year old, presenting for the following health issues:  Recheck Medication and ER F/U (Discuss lab results from ER numbness in hands and feet, general body pain, fatigue )        7/28/2023     7:19 AM   Additional Questions   Roomed by Ratna   Accompanied by none         7/28/2023     7:19 AM   Patient Reported Additional Medications   Patient reports taking the following new medications none       History of Present Illness       Reason for visit:  Pain  Symptom onset:  More than a month    She eats 0-1 " servings of fruits and vegetables daily.She consumes 2 sweetened beverage(s) daily.She exercises with enough effort to increase her heart rate 9 or less minutes per day.  She exercises with enough effort to increase her heart rate 3 or less days per week.   She is taking medications regularly.   The patient's neurologic exam is completely normal currently.  She reports that her sensation is equal to light touch bilaterally, and she states she feels though her sensation is normal with light touch testing.  Symptoms are intermittent, did not involve the thigh or the trunk bilaterally.  Given this, I am not concerned about a spinal cord injury/abnormality.  Given the bilateral nature, central cause is extremely unlikely.  Basic labs were checked, CRP is minimally elevated.  I did check this in light of the complaint of dry mouth and dry eyes.  Exam here is essentially completely normal.  The cause of her symptoms are not clear, but no emergent cause was identified.  Cannot exclude the possibility of a rheumatologic issue, underlying neurologic abnormality.  No clear infectious signs or symptoms, though atypical infection is a possibility.  I do want her to follow-up with primary care.  I did place a primary care referral. She is encouraged to return to the ER with any new or worsening symptoms, any other concerns.  She verbalizes understanding and is agreeable to the plan.     ED/UC Followup:  Facility:  [facility]   Date of visit: 07/27/23  Reason for visit: numbness   Current Status: still feels numbness, no change, discuss lab results from ER visit     Symptoms around 1 year. Symptoms daily. Worsens after sitting for long periods.   Fatigues/weakness if up walking for long period,   BECK with walking up stairs.   + chest pain and palpitations.   + pressure/pulsing in ears.  + headaches every day.   + reports edema in BLE occasional.   + pain in bilateral knees and hips occasional.     Had baby in  "October 2022, stopped breast feeding 3 months ago. Had 2 periods since then.        Review of Systems   Constitutional, HEENT, cardiovascular, pulmonary, gi and gu systems are negative, except as otherwise noted.      Objective    /84 (BP Location: Right arm, Patient Position: Sitting, Cuff Size: Adult Large)   Pulse 74   Temp 98.3  F (36.8  C) (Oral)   Resp 24   Ht 1.651 m (5' 5\")   Wt 84.3 kg (185 lb 12.8 oz)   LMP 07/14/2023   SpO2 100%   Breastfeeding No   BMI 30.92 kg/m    Body mass index is 30.92 kg/m .  Physical Exam   GENERAL: healthy, alert and no distress  EYES: Eyes grossly normal to inspection, PERRL and conjunctivae and sclerae normal  NECK: no adenopathy, no asymmetry, masses, or scars and thyroid normal to palpation  RESP: lungs clear to auscultation - no rales, rhonchi or wheezes  CV: regular rate and rhythm, normal S1 S2, no S3 or S4, no murmur, click or rub, no peripheral edema and peripheral pulses strong  ABDOMEN: soft, nontender, no hepatosplenomegaly, no masses and bowel sounds normal  MS: no gross musculoskeletal defects noted, no edema  SKIN: steve, a few nevi various locations, no suspicious lesions or rashes  NEURO: Normal strength and tone, mentation intact and speech normal    No results found for this or any previous visit (from the past 24 hour(s)).                  "

## 2023-07-28 NOTE — PATIENT INSTRUCTIONS
Schedule follow up with me in 2 months - schedule lab only visit the week before to get Vitamin D level rechecked    Continue plan to see endocrine for abnormal thyroid labs on 7/31 (rechecking labs today, [facility] provider will be able to see these updated labs).    Schedule dermatology appointment - not urgent, full body skin exam (skin cancer screening/prevention)      IF no treatable findings, will refer to rheumatologist

## 2023-07-31 LAB
ANA SER QL IF: NEGATIVE
RHEUMATOID FACT SER NEPH-ACNC: 33 IU/ML

## 2023-08-05 RX ORDER — FERROUS SULFATE 325(65) MG
325 TABLET ORAL EVERY OTHER DAY
Qty: 60 TABLET | Refills: 0 | Status: SHIPPED | OUTPATIENT
Start: 2023-08-05 | End: 2023-08-11

## 2023-08-11 ENCOUNTER — OFFICE VISIT (OUTPATIENT)
Dept: FAMILY MEDICINE | Facility: CLINIC | Age: 33
End: 2023-08-11
Payer: COMMERCIAL

## 2023-08-11 VITALS
BODY MASS INDEX: 29.89 KG/M2 | SYSTOLIC BLOOD PRESSURE: 94 MMHG | DIASTOLIC BLOOD PRESSURE: 60 MMHG | WEIGHT: 179.4 LBS | HEIGHT: 65 IN | RESPIRATION RATE: 16 BRPM | TEMPERATURE: 98.4 F | OXYGEN SATURATION: 100 % | HEART RATE: 75 BPM

## 2023-08-11 DIAGNOSIS — R06.09 DOE (DYSPNEA ON EXERTION): ICD-10-CM

## 2023-08-11 DIAGNOSIS — N92.0 MENORRHAGIA WITH REGULAR CYCLE: ICD-10-CM

## 2023-08-11 DIAGNOSIS — A04.8 HELICOBACTER PYLORI (H. PYLORI) INFECTION: ICD-10-CM

## 2023-08-11 DIAGNOSIS — R10.13 EPIGASTRIC PAIN: Primary | ICD-10-CM

## 2023-08-11 DIAGNOSIS — E61.1 IRON DEFICIENCY: ICD-10-CM

## 2023-08-11 DIAGNOSIS — R76.8 ELEVATED RHEUMATOID FACTOR: ICD-10-CM

## 2023-08-11 DIAGNOSIS — R20.2 PARESTHESIA: ICD-10-CM

## 2023-08-11 PROBLEM — O09.529 MULTIGRAVIDA OF ADVANCED MATERNAL AGE: Status: RESOLVED | Noted: 2022-04-28 | Resolved: 2023-08-11

## 2023-08-11 PROBLEM — F51.01 INSOMNIA, IDIOPATHIC: Status: ACTIVE | Noted: 2018-11-07

## 2023-08-11 LAB
CRP SERPL-MCNC: 5.34 MG/L
HBA1C MFR BLD: 6.2 % (ref 0–5.6)
IRON BINDING CAPACITY (ROCHE): 324 UG/DL (ref 240–430)
IRON SATN MFR SERPL: 8 % (ref 15–46)
IRON SERPL-MCNC: 25 UG/DL (ref 37–145)

## 2023-08-11 PROCEDURE — 83550 IRON BINDING TEST: CPT

## 2023-08-11 PROCEDURE — 36415 COLL VENOUS BLD VENIPUNCTURE: CPT

## 2023-08-11 PROCEDURE — 83036 HEMOGLOBIN GLYCOSYLATED A1C: CPT

## 2023-08-11 PROCEDURE — 83540 ASSAY OF IRON: CPT

## 2023-08-11 PROCEDURE — 99215 OFFICE O/P EST HI 40 MIN: CPT

## 2023-08-11 PROCEDURE — 86431 RHEUMATOID FACTOR QUANT: CPT

## 2023-08-11 PROCEDURE — 86140 C-REACTIVE PROTEIN: CPT

## 2023-08-11 RX ORDER — ALBUTEROL SULFATE 90 UG/1
AEROSOL, METERED RESPIRATORY (INHALATION)
COMMUNITY
Start: 2023-04-14 | End: 2023-11-08

## 2023-08-11 RX ORDER — CEPHALEXIN 500 MG/1
CAPSULE ORAL
COMMUNITY
Start: 2023-08-02 | End: 2023-11-08

## 2023-08-11 RX ORDER — DILTIAZEM HYDROCHLORIDE 60 MG/1
2 TABLET, FILM COATED ORAL 2 TIMES DAILY
COMMUNITY
Start: 2023-04-17 | End: 2023-11-08

## 2023-08-11 RX ORDER — FERROUS SULFATE 325(65) MG
325 TABLET ORAL EVERY OTHER DAY
Qty: 60 TABLET | Refills: 0 | Status: SHIPPED | OUTPATIENT
Start: 2023-08-11 | End: 2023-11-08

## 2023-08-11 RX ORDER — PANTOPRAZOLE SODIUM 40 MG/1
40 TABLET, DELAYED RELEASE ORAL DAILY
Qty: 60 TABLET | Refills: 0 | Status: SHIPPED | OUTPATIENT
Start: 2023-08-11 | End: 2023-08-15 | Stop reason: ALTCHOICE

## 2023-08-11 ASSESSMENT — PATIENT HEALTH QUESTIONNAIRE - PHQ9
10. IF YOU CHECKED OFF ANY PROBLEMS, HOW DIFFICULT HAVE THESE PROBLEMS MADE IT FOR YOU TO DO YOUR WORK, TAKE CARE OF THINGS AT HOME, OR GET ALONG WITH OTHER PEOPLE: NOT DIFFICULT AT ALL
SUM OF ALL RESPONSES TO PHQ QUESTIONS 1-9: 1
SUM OF ALL RESPONSES TO PHQ QUESTIONS 1-9: 1

## 2023-08-11 ASSESSMENT — PAIN SCALES - GENERAL: PAINLEVEL: NO PAIN (0)

## 2023-08-11 NOTE — PROGRESS NOTES
Assessment & Plan     Epigastric pain  Hpylori positive. Will treat and reassess in 1 month, cardiac workup has been normal.  - Helicobacter pylori Antigen Stool    Helicobacter pylori (H. pylori) infection  Triple therapy with high dose PPI and extended taper considering her low iron and significant pain.   - omeprazole (PRILOSEC) 20 MG DR capsule  Dispense: 100 capsule; Refill: 0  - clarithromycin (BIAXIN) 500 MG tablet  Dispense: 28 tablet; Refill: 0  - amoxicillin (AMOXIL) 500 MG capsule  Dispense: 56 capsule; Refill: 0  - Lactobacillus Probiotic TABS  Dispense: 90 tablet; Refill: 0    Iron deficiency  Unclear etiology but likely associated with Hypylori though hgb was normal on prior labs and pt denies blood in stool. ? Contributing to her SOB. Could also be due to pts heavy periods.   - ferrous sulfate (FEROSUL) 325 (65 Fe) MG tablet  Dispense: 60 tablet; Refill: 0  - Iron and iron binding capacity  - Ob/Gyn Referral  - Iron and iron binding capacity    BECK (dyspnea on exertion)  Suspect due to Hpylori infection and poor nutritional intake. However hgb was stable with prior labs. Cardiac workup has been normal.   - General PFT Lab (Please always keep checked)  - Adult Rheumatology  Referral    Elevated rheumatoid factor  Unclear etiology. Could be + due to infection with hpylori ? Or RA given pts multiple joint pain. Can cancel rhuem if symptoms resolve with hpylori tx.  - Adult Rheumatology  Referral  - Rheumatoid factor  - CRP, inflammation    Paresthesia  - Adult Rheumatology  Referral  - Hemoglobin A1c    Menorrhagia with regular cycle  - Ob/Gyn Referral      Ordering of each unique test  Prescription drug management  I spent a total of 40 minutes on the day of the visit.   Time spent by me doing chart review, history and exam, documentation and further activities per the note       MED REC REQUIRED  Post Medication Reconciliation Status:     BMI:   Estimated body mass index is  Procedure To Be Performed At Next Visit: Excision "29.85 kg/m  as calculated from the following:    Height as of this encounter: 1.651 m (5' 5\").    Weight as of this encounter: 81.4 kg (179 lb 6.4 oz).   Weight management plan: Discussed healthy diet and exercise guidelines    See Patient Instructions    KENROY Lau CNP  [facility] ARMEN Lowe is a 33 year old, presenting for the following health issues:  ER F/U      History of Present Illness       Reason for visit:  Pain    She eats 2-3 servings of fruits and vegetables daily.She consumes 0 sweetened beverage(s) daily.She exercises with enough effort to increase her heart rate 10 to 19 minutes per day.    She is taking medications regularly.       ED/UC Followup:    Facility:  [facility]   Date of visit: 8/1/2023  Reason for visit: Non intractable Headache, back/flank pain  Current Status: Patient voices no concerns today. She no longer has a headache and no pain anywhere.   Treated for UTI with 7 days of cephalexin. Pain has resolved.  Head CT [facility] ER 8/1/23  FINDINGS:   CSF spaces: Within normal limits for age. Unchanged small calcification along the dura overlying the superior posterior right frontal lobe.   Brain parenchyma and extra-axial spaces:  The gray-white differentiation is normal.  No sign of mass, hemorrhage, or midline shift. No extra-axial fluid collection.    Skull base and calvarium: The visualized paranasal sinuses demonstrate no acute or significant findings.  The mastoid air cells are clear. The visualized orbits are grossly unremarkable.  No skull fractures.     Feeling back to normal. Back/flank pain has resolved, no blood in urine. No headaches, No dizziness, no blurry vision, improved fatigue.       LABS FROM PRIOR VISIT  CRP >6 in clinic on 7/27, ER visit 8  Hgb 2/27 11.4, ER visit 13.8  Iron  29    Still having SOB with stairs. + chest pain, sometimes coughing.   + daily pain in bilateral knees and hips    Daily BM. " Detail Level: Detailed "  No appetite.      Wt Readings from Last 5 Encounters:   08/11/23 81.4 kg (179 lb 6.4 oz)   07/28/23 84.3 kg (185 lb 12.8 oz)   03/03/08 58.1 kg (128 lb) (52 %, Z= 0.06)*   01/29/08 57.9 kg (127 lb 11.2 oz) (52 %, Z= 0.06)*     * Growth percentiles are based on [facility] (Girls, 2-20 Years) data.              Review of Systems   Constitutional, HEENT, cardiovascular, pulmonary, gi and gu systems are negative, except as otherwise noted.      Objective    BP 94/60   Pulse 75   Temp 98.4  F (36.9  C) (Oral)   Resp 16   Ht 1.651 m (5' 5\")   Wt 81.4 kg (179 lb 6.4 oz)   LMP 08/05/2023 (Exact Date)   SpO2 100%   BMI 29.85 kg/m    Body mass index is 29.85 kg/m .  Physical Exam   GENERAL: healthy, alert and no distress  NECK: no adenopathy, no asymmetry, masses, or scars and thyroid normal to palpation  RESP: lungs clear to auscultation - no rales, rhonchi or wheezes  CV: regular rate and rhythm, normal S1 S2, no S3 or S4, no murmur, click or rub, no peripheral edema and peripheral pulses strong  ABDOMEN: soft, nontender, no hepatosplenomegaly, no masses and bowel sounds normal  MS: no gross musculoskeletal defects noted, no edema    No results found for this or any previous visit (from the past 24 hour(s)).                  " Introduction Text (Please End With A Colon): The following procedure was deferred: PDT or topical therapy

## 2023-08-11 NOTE — PATIENT INSTRUCTIONS
Continue plan to echocardiogram on Monday 8/14    Schedule pulmonary function testing - bring your inhaler to this appointment    Start iron supplement - take every other day and with food if causes stomach upset    Start protonix every day as soon as you wake up, 1 hour before eating.     Schedule rheumatology appointment - in February

## 2023-08-14 ENCOUNTER — APPOINTMENT (OUTPATIENT)
Dept: LAB | Facility: CLINIC | Age: 33
End: 2023-08-14
Payer: COMMERCIAL

## 2023-08-14 ENCOUNTER — ANCILLARY PROCEDURE (OUTPATIENT)
Dept: CARDIOLOGY | Facility: CLINIC | Age: 33
End: 2023-08-14
Payer: COMMERCIAL

## 2023-08-14 DIAGNOSIS — R06.02 SHORTNESS OF BREATH: ICD-10-CM

## 2023-08-14 DIAGNOSIS — R06.09 DOE (DYSPNEA ON EXERTION): ICD-10-CM

## 2023-08-14 LAB
LVEF ECHO: NORMAL
RHEUMATOID FACT SER NEPH-ACNC: 32 IU/ML

## 2023-08-14 PROCEDURE — 93306 TTE W/DOPPLER COMPLETE: CPT | Mod: GC | Performed by: INTERNAL MEDICINE

## 2023-08-14 PROCEDURE — 87338 HPYLORI STOOL AG IA: CPT

## 2023-08-15 ENCOUNTER — OFFICE VISIT (OUTPATIENT)
Dept: PULMONOLOGY | Facility: CLINIC | Age: 33
End: 2023-08-15
Payer: COMMERCIAL

## 2023-08-15 DIAGNOSIS — R06.09 DOE (DYSPNEA ON EXERTION): ICD-10-CM

## 2023-08-15 LAB — H PYLORI AG STL QL IA: POSITIVE

## 2023-08-15 PROCEDURE — 94729 DIFFUSING CAPACITY: CPT | Performed by: INTERNAL MEDICINE

## 2023-08-15 PROCEDURE — 94375 RESPIRATORY FLOW VOLUME LOOP: CPT | Performed by: INTERNAL MEDICINE

## 2023-08-15 PROCEDURE — 94726 PLETHYSMOGRAPHY LUNG VOLUMES: CPT | Performed by: INTERNAL MEDICINE

## 2023-08-15 RX ORDER — AMOXICILLIN 500 MG/1
1000 CAPSULE ORAL 2 TIMES DAILY
Qty: 56 CAPSULE | Refills: 0 | Status: SHIPPED | OUTPATIENT
Start: 2023-08-15 | End: 2023-08-29

## 2023-08-15 RX ORDER — L. ACIDOPHILUS/L.BULGARICUS 1MM CELL
1 TABLET ORAL DAILY
Qty: 90 TABLET | Refills: 0 | Status: SHIPPED | OUTPATIENT
Start: 2023-08-15 | End: 2023-11-08

## 2023-08-15 RX ORDER — CLARITHROMYCIN 500 MG
500 TABLET ORAL 2 TIMES DAILY
Qty: 28 TABLET | Refills: 0 | Status: SHIPPED | OUTPATIENT
Start: 2023-08-15 | End: 2023-08-29

## 2023-08-15 NOTE — PROGRESS NOTES
Chrissy Hernandez completed full PFT battery in lab today. Patient had difficulty reaching RV and inspiratory volume for DLCO. Patient left without complaint or distress. CMW

## 2023-08-16 ENCOUNTER — OFFICE VISIT (OUTPATIENT)
Dept: MIDWIFE SERVICES | Facility: CLINIC | Age: 33
End: 2023-08-16
Payer: COMMERCIAL

## 2023-08-16 VITALS
WEIGHT: 178.9 LBS | BODY MASS INDEX: 29.77 KG/M2 | OXYGEN SATURATION: 100 % | HEART RATE: 84 BPM | SYSTOLIC BLOOD PRESSURE: 107 MMHG | DIASTOLIC BLOOD PRESSURE: 74 MMHG

## 2023-08-16 DIAGNOSIS — Z30.430 ENCOUNTER FOR INSERTION OF INTRAUTERINE CONTRACEPTIVE DEVICE: Primary | ICD-10-CM

## 2023-08-16 LAB
DLCOUNC-%PRED-PRE: 82 %
DLCOUNC-PRE: 18.84 ML/MIN/MMHG
DLCOUNC-PRED: 22.72 ML/MIN/MMHG
ERV-%PRED-PRE: 48 %
ERV-PRE: 0.67 L
ERV-PRED: 1.38 L
EXPTIME-PRE: 3.76 SEC
FEF2575-%PRED-PRE: 90 %
FEF2575-PRE: 3.05 L/SEC
FEF2575-PRED: 3.36 L/SEC
FEFMAX-%PRED-PRE: 68 %
FEFMAX-PRE: 4.94 L/SEC
FEFMAX-PRED: 7.25 L/SEC
FEV1-%PRED-PRE: 80 %
FEV1-PRE: 2.47 L
FEV1FEV6-PRE: 92 %
FEV1FEV6-PRED: 85 %
FEV1FVC-PRE: 94 %
FEV1FVC-PRED: 85 %
FEV1SVC-PRE: 90 %
FEV1SVC-PRED: 73 %
FIFMAX-PRE: 4.7 L/SEC
FRCPLETH-%PRED-PRE: 84 %
FRCPLETH-PRE: 2.33 L
FRCPLETH-PRED: 2.76 L
FVC-%PRED-PRE: 72 %
FVC-PRE: 2.63 L
FVC-PRED: 3.65 L
HCG UR QL: NEGATIVE
IC-%PRED-PRE: 76 %
IC-PRE: 2.09 L
IC-PRED: 2.72 L
RVPLETH-%PRED-PRE: 129 %
RVPLETH-PRE: 1.66 L
RVPLETH-PRED: 1.28 L
TLCPLETH-%PRED-PRE: 80 %
TLCPLETH-PRE: 4.41 L
TLCPLETH-PRED: 5.47 L
VA-%PRED-PRE: 56 %
VA-PRE: 2.91 L
VC-%PRED-PRE: 65 %
VC-PRE: 2.76 L
VC-PRED: 4.19 L

## 2023-08-16 PROCEDURE — 81025 URINE PREGNANCY TEST: CPT | Performed by: ADVANCED PRACTICE MIDWIFE

## 2023-08-16 PROCEDURE — 58300 INSERT INTRAUTERINE DEVICE: CPT | Performed by: ADVANCED PRACTICE MIDWIFE

## 2023-08-16 RX ORDER — COPPER 313.4 MG/1
1 INTRAUTERINE DEVICE INTRAUTERINE ONCE
Status: COMPLETED
Start: 2023-08-16 | End: 2023-08-16

## 2023-08-16 RX ORDER — COPPER 313.4 MG/1
1 INTRAUTERINE DEVICE INTRAUTERINE ONCE
COMMUNITY
End: 2023-09-28

## 2023-08-16 RX ADMIN — COPPER 1 EACH: 313.4 INTRAUTERINE DEVICE INTRAUTERINE at 15:37

## 2023-08-16 NOTE — PROGRESS NOTES
IUD Insertion:  CONSULT:    Is a pregnancy test required: Yes.  Was it positive or negative?  Negative  Was a consent obtained?  Yes    Subjective: Chrissy Hernandez is a 33 year old  presents for IUD and desires Paragard type IUD.    Patient has been given the opportunity to ask questions about all forms of birth control, including all options appropriate for Chrissy Hernandez. Discussed that no method of birth control, except abstinence is 100% effective against pregnancy or sexually transmitted infection.     Chrissy Hernandez understands she may have the IUD removed at any time. IUD should be removed by a health care provider.    The entire insertion procedure was reviewed with the patient, including care after placement.    Patient's last menstrual period was 2023 (exact date). No allergy to betadine or shellfish. Patient declines STD screening  HCG Qual Urine   Date Value Ref Range Status   2023 Negative Negative Final     hCG Urine Qualitative   Date Value Ref Range Status   2023 Negative Negative Final     Comment:     This test is for screening purposes.  Results should be interpreted along with the clinical picture.  Confirmation testing is available if warranted by ordering [id number], HCG Quantitative Pregnancy.         /74 (BP Location: Left arm, Patient Position: Sitting, Cuff Size: Adult Regular)   Pulse 84   Wt 81.1 kg (178 lb 14.4 oz)   LMP 2023 (Exact Date)   SpO2 100%   BMI 29.77 kg/m      Pelvic Exam:   EG/BUS: normal genital architecture without lesions, erythema or abnormal secretions.   Vagina: moist, pink, rugae with physiologic discharge and secretions  Cervix: Multiparous no lesions and pink, moist, closed, without lesion or CMT  Uterus: anteverted position, mobile, no pain  Adnexa: within normal limits and no masses, nodularity, tenderness    PROCEDURE NOTE: -- IUD Insertion    Reason for Insertion: contraception    Premedicated with ibuprofen. Paracervical block  performed.  Under sterile technique, cervix was visualized with speculum and prepped with Betadine solution swab x 3. Tenaculum was placed for stability. The uterus was gently straightened and sounded to 7.5 cm. IUD prepared for placement, and IUD inserted according to 's instructions without difficulty or significant resitance, and deployed at the fundus. The strings were visualized and trimmed to 2.0 cm from the external os. Tenaculum was removed and hemostasis noted. Speculum removed.  Patient tolerated procedure well.    Lot # [id number]  Exp: 4/2029    EBL: minimal    Complications: none    ASSESSMENT:     ICD-10-CM    1. Encounter for insertion of intrauterine contraceptive device  Z30.430 HCG Qual, Urine ([id number])     paragard intrauterine copper IUD device 1 each     INSERTION INTRAUTERINE DEVICE     paragard intrauterine copper device             PLAN:    Given 's handouts, including when to have IUD removed, list of danger s/sx, side effects and follow up recommended. Encouraged condom use for prevention of STD. Back up contraception advised for 7 days if progestin method. Advised to call for any fever, for prolonged or severe pain or bleeding, abnormal vaginal discharge, or unable to palpate strings. She was advised to use pain medications (ibuprofen) as needed for mild to moderate pain. Advised to follow-up in clinic in 4-6 weeks for IUD string check if unable to find strings or as directed by provider.     Jenn SAWYER    I have reviewed and verified the documentation as completed by the Nurse Midwife Student, of the procedure which I personally performed.  Juan Garcia CNM  August 16, 2023

## 2023-09-28 ENCOUNTER — OFFICE VISIT (OUTPATIENT)
Dept: OBGYN | Facility: CLINIC | Age: 33
End: 2023-09-28
Payer: COMMERCIAL

## 2023-09-28 VITALS — BODY MASS INDEX: 28.26 KG/M2 | DIASTOLIC BLOOD PRESSURE: 58 MMHG | SYSTOLIC BLOOD PRESSURE: 92 MMHG | WEIGHT: 169.8 LBS

## 2023-09-28 DIAGNOSIS — Z30.432 ENCOUNTER FOR REMOVAL OF INTRAUTERINE CONTRACEPTIVE DEVICE: Primary | ICD-10-CM

## 2023-09-28 PROCEDURE — 58301 REMOVE INTRAUTERINE DEVICE: CPT | Performed by: NURSE PRACTITIONER

## 2023-09-28 NOTE — PROGRESS NOTES
IUD Removal:  SUBJECTIVE:    Is a pregnancy test required: No.  Was a consent obtained?  Yes    Chrissy Hernandez is a 33 year old female,, Patient's last menstrual period was 2023 (exact date). who presents today for IUD removal. Her current IUD was placed 2023. She has had problems including bleeding and cramping  with the IUD. She requests removal of the IUD because she wants to change her method of contraception    Today's PHQ-2 Score:       2023     6:49 AM   PHQ-2 (  [employer])   Q1: Little interest or pleasure in doing things 0   Q2: Feeling down, depressed or hopeless 0   PHQ-2 Score 0   Q1: Little interest or pleasure in doing things Not at all   Q2: Feeling down, depressed or hopeless Not at all   PHQ-2 Score 0       PROCEDURE:    A speculum exam was performed and the cervix was visualized. The IUD string was visualized. Using ring forceps, the string  was grasped and the IUD removed intact.    POST PROCEDURE:    The patient tolerated the procedure well. Patient was discharged in stable condition.    Call if bleeding, pain or fever occur. and Birth control counseling given.    KENROY Sullivan CNP

## 2023-09-28 NOTE — PROGRESS NOTES
SUBJECTIVE:                                                   Chrissy Hernandez is a 33 year old female who presents to clinic today for the following health issue(s):  No chief complaint on file.      Additional information: ***    HPI:  ***    Patient's last menstrual period was 2023 (exact date)..     Patient {is/is not:053930} sexually active, .  Using {PLC CONTRACEPTION:298889} for contraception.    reports that she has never smoked. She has never been exposed to tobacco smoke. She has never used smokeless tobacco.  {Tobacco Cessation -- Delete if patient is a non-smoker:018628}  STD testing offered?  {PLC GC/CHLAMYDIA:008092}    Health maintenance updated:  {yes no:966444}    Today's PHQ-2 Score:       2023     6:49 AM   PHQ-2 (  [employer])   Q1: Little interest or pleasure in doing things 0   Q2: Feeling down, depressed or hopeless 0   PHQ-2 Score 0   Q1: Little interest or pleasure in doing things Not at all   Q2: Feeling down, depressed or hopeless Not at all   PHQ-2 Score 0     Today's PHQ-9 Score:       2023     2:06 PM   PHQ-9 SCORE   PHQ-9 Total Score MyChart 1 (Minimal depression)   PHQ-9 Total Score 1     Today's SILVIA-7 Score:        No data to display                Problem list and histories reviewed & adjusted, as indicated.  Additional history: as documented.    Patient Active Problem List   Diagnosis    Carcinoma in situ of cervix    DENYS III (cervical intraepithelial neoplasia grade III) with severe dysplasia    Female genital mutilation type II status    History of pulmonary embolus (PE)    Low grade squamous intraepithelial lesion (LGSIL) on cervicovaginal cytologic smear    Mild chronic anemia    Seasonal allergies    Vitamin D deficiency    Insomnia, idiopathic     Past Surgical History:   Procedure Laterality Date    C/SECTION, CLASSICAL        Social History     Tobacco Use    Smoking status: Never     Passive exposure: Never    Smokeless tobacco: Never   Substance  "Use Topics    Alcohol use: Never      No data available              Current Outpatient Medications   Medication Sig    cephALEXin (KEFLEX) 500 MG capsule TAKE 1 CAPSULE (500 MG) BY MOUTH TWO TIMES DAILY FOR 7 DAYS    CLARITIN 10 MG OR TABS ONE DAILY    ferrous sulfate (FEROSUL) 325 (65 Fe) MG tablet Take 1 tablet (325 mg) by mouth every other day    Lactobacillus Probiotic TABS Take 1 capsule by mouth daily    MIRALAX OR POWD STIR 1 CAPFUL (17GM) IN 8 OZ OF LIQUID AND DRINK    NASONEX 50 MCG/ACT NA SUSP INHALE 2 SPRAYS IN EACH NOSTRIL ONE DAILY    paragard intrauterine copper device 1 each by Intrauterine route once    SYMBICORT 80-4.5 MCG/ACT Inhaler Inhale 2 puffs into the lungs 2 times daily    VENTOLIN  (90 Base) MCG/ACT inhaler PLEASE SEE ATTACHED FOR DETAILED DIRECTIONS    vitamin D2 (ERGOCALCIFEROL) 57565 units (1250 mcg) capsule Take 1 capsule (50,000 Units) by mouth once a week     No current facility-administered medications for this visit.     Allergies   Allergen Reactions    Egg Solids, Whole Itching       ROS:  {[facility] ROSGYN:407993::\"12 point review of systems negative other than symptoms noted below or in the HPI.\"}  {ROS - :310423}      OBJECTIVE:     LMP 08/05/2023 (Exact Date)   There is no height or weight on file to calculate BMI.    Exam:  {[facility] EXAM CHOICES:679636}     In-Clinic Test Results:  No results found for this or any previous visit (from the past 24 hour(s)).    ASSESSMENT/PLAN:                                                      No diagnosis found.    There are no Patient Instructions on file for this visit.    ***    KENROY Sullivan [facility]    "

## 2023-10-07 ENCOUNTER — HOSPITAL ENCOUNTER (EMERGENCY)
Facility: CLINIC | Age: 33
Discharge: HOME | End: 2023-10-08
Attending: EMERGENCY MEDICINE | Admitting: EMERGENCY MEDICINE
Payer: COMMERCIAL

## 2023-10-07 DIAGNOSIS — J02.9 PHARYNGITIS, UNSPECIFIED ETIOLOGY: ICD-10-CM

## 2023-10-07 PROCEDURE — 99284 EMERGENCY DEPT VISIT MOD MDM: CPT | Performed by: EMERGENCY MEDICINE

## 2023-10-07 PROCEDURE — 87637 SARSCOV2&INF A&B&RSV AMP PRB: CPT | Mod: 59 | Performed by: EMERGENCY MEDICINE

## 2023-10-07 PROCEDURE — 87651 STREP A DNA AMP PROBE: CPT | Performed by: EMERGENCY MEDICINE

## 2023-10-07 PROCEDURE — 99283 EMERGENCY DEPT VISIT LOW MDM: CPT | Performed by: EMERGENCY MEDICINE

## 2023-10-07 RX ORDER — AMOXICILLIN 500 MG/1
500 TABLET, FILM COATED ORAL
COMMUNITY
Start: 2023-10-06 | End: 2023-10-16

## 2023-10-08 VITALS
HEART RATE: 73 BPM | SYSTOLIC BLOOD PRESSURE: 121 MMHG | TEMPERATURE: 98.3 F | OXYGEN SATURATION: 100 % | RESPIRATION RATE: 16 BRPM | DIASTOLIC BLOOD PRESSURE: 73 MMHG

## 2023-10-08 LAB
FLUAV RNA SPEC QL NAA+PROBE: NEGATIVE
FLUBV RNA RESP QL NAA+PROBE: NEGATIVE
GROUP A STREP BY PCR: NOT DETECTED
RSV RNA SPEC NAA+PROBE: NEGATIVE
SARS-COV-2 RNA RESP QL NAA+PROBE: NEGATIVE

## 2023-10-08 PROCEDURE — 250N000013 HC RX MED GY IP 250 OP 250 PS 637: Performed by: EMERGENCY MEDICINE

## 2023-10-08 RX ORDER — ACETAMINOPHEN 500 MG
1000 TABLET ORAL ONCE
Status: COMPLETED | OUTPATIENT
Start: 2023-10-08 | End: 2023-10-08

## 2023-10-08 RX ADMIN — ACETAMINOPHEN 1000 MG: 500 TABLET ORAL at 00:45

## 2023-10-08 ASSESSMENT — ACTIVITIES OF DAILY LIVING (ADL): ADLS_ACUITY_SCORE: 33

## 2023-10-08 NOTE — ED PROVIDER NOTES
ED Provider Note  [facility]      History     Chief Complaint   Patient presents with    Pharyngitis     HPI  Chrissy Hernandez is a 33 year old female presenting with 2 weeks of sore throat and swollen lymph nodes. It became more uncomfortable 3 days ago, with pain in neck and ears. She reports difficulty swallowing and feels as though there is something in her throat. She was seen at  2 days ago and it was determined she did not have strep. She was prescribed amoxicillin. She reports initial onset 4 months ago, but it got better for a while, then came back. She reports subjective fevers. No cough or runny nose. She has no breathing problems. She is swallowing her saliva normally.     Friend interpreted, patient declined phone     Past Medical History  Past Medical History:   Diagnosis Date    Major depressive disorder with single episode, in full remission (H) 12/04/2012    Formatting of this note might be different from the original. Overview: Postpartum onset, severe, 2012, hospitalized Formatting of this note might be different from the original. Postpartum onset, severe fall 2012, hospitalized Formatting of this note might be different from the original. Overview: Postpartum onset, severe, 2012, hospitalized    Wrist tendonitis 11/13/2008     Past Surgical History:   Procedure Laterality Date    C/SECTION, CLASSICAL  2021     amoxicillin (AMOXIL) 500 MG tablet  cephALEXin (KEFLEX) 500 MG capsule  CLARITIN 10 MG OR TABS  ferrous sulfate (FEROSUL) 325 (65 Fe) MG tablet  Lactobacillus Probiotic TABS  MIRALAX OR POWD  NASONEX 50 MCG/ACT NA SUSP  SYMBICORT 80-4.5 MCG/ACT Inhaler  VENTOLIN  (90 Base) MCG/ACT inhaler  vitamin D2 (ERGOCALCIFEROL) 72070 units (1250 mcg) capsule      Allergies   Allergen Reactions    Egg Solids, Whole Itching     Family History  No family history on file.  Social History   Social History     Tobacco Use    Smoking status: Never     Passive  exposure: Never    Smokeless tobacco: Never   Vaping Use    Vaping Use: Never used   Substance Use Topics    Alcohol use: Never    Drug use: Never      Past medical history, past surgical history, medications, allergies, family history, and social history were reviewed with the patient. No additional pertinent items.      A complete review of systems was performed with pertinent positives and negatives noted in the HPI, and all other systems negative.    Physical Exam   BP: 104/67  Pulse: 75  Temp: 98.5  F (36.9  C)  Resp: 16  SpO2: 100 %  Physical Exam  Physical Exam   Constitutional: oriented to person, place, and time. appears well-developed and well-nourished.   HENT:   Head: Normocephalic and atraumatic.  Uvula midline.  No peritonsillar swelling.  No exudates in the posterior pharynx.  Floor mouth is soft.  Tongue is not elevated.  No significant swelling erythema.  Neck: Normal range of motion.  No stridor.  No lymphadenopathy.  No tenderness over the anterior neck structures.  Pulmonary/Chest: Effort normal. No respiratory distress.   Cardiac: No murmurs, rubs, gallops. RRR.  Abdominal: Abdomen soft, nontender, nondistended. No rebound tenderness.  MSK: Long bones without deformity or evidence of trauma  Neurological: alert and oriented to person, place, and time.   Skin: Skin is warm and dry.   Psychiatric:  normal mood and affect.  behavior is normal. Thought content normal.     ED Course, Procedures, & Data      Procedures                Results for orders placed or performed during the hospital encounter of 10/07/23   Symptomatic Influenza A/B, RSV, & SARS-CoV2 PCR (COVID-19) Nasopharyngeal     Status: Normal    Specimen: Nasopharyngeal; Swab   Result Value Ref Range    Influenza A PCR Negative Negative    Influenza B PCR Negative Negative    RSV PCR Negative Negative    SARS CoV2 PCR Negative Negative    Narrative    Testing was performed using the Xpert Xpress CoV2/Flu/RSV Assay on the [employer] Instrument. This test should be ordered for the detection of SARS-CoV-2, influenza, and RSV viruses in individuals who meet clinical and/or epidemiological criteria. Test performance is unknown in asymptomatic patients. This test is for in vitro diagnostic use under the FDA EUA for laboratories certified under CLIA to perform high or moderate complexity testing. This test has not been FDA cleared or approved. A negative result does not rule out the presence of PCR inhibitors in the specimen or target RNA in concentration below the limit of detection for the assay. If only one viral target is positive but coinfection with multiple targets is suspected, the sample should be re-tested with another FDA cleared, approved, or authorized test, if coinfection would change clinical management. This test was validated by the [facility]. These laboratories are certified under the Clinical Laboratory Improvement Amendments of 1988 (CLIA-88) as qualified to perform high complexity laboratory testing.   Group A Streptococcus PCR Throat Swab     Status: Normal    Specimen: Throat; Swab   Result Value Ref Range    Group A strep by PCR Not Detected Not Detected    Narrative    The Xpert Xpress Strep A test, performed on the [employer]  Instrument Systems, is a rapid, qualitative in vitro diagnostic test for the detection of Streptococcus pyogenes (Group A ß-hemolytic Streptococcus, Strep A) in throat swab specimens from patients with signs and symptoms of pharyngitis. The Xpert Xpress Strep A test can be used as an aid in the diagnosis of Group A Streptococcal pharyngitis. The assay is not intended to monitor treatment for Group A Streptococcus infections. The Xpert Xpress Strep A test utilizes an automated real-time polymerase chain reaction (PCR) to detect Streptococcus pyogenes DNA.     Medications - No data to display  Labs Ordered and Resulted from Time of ED Arrival to Time of ED Departure - No  data to display  No orders to display          Critical care was not performed.     Medical Decision Making  The patient's presentation was of low complexity (a stable chronic illness).    The patient's evaluation involved:  review of external note(s) from 1 sources (prior UC visit)  ordering and/or review of 2 test(s) in this encounter (see separate area of note for details)    The patient's management necessitated only low risk treatment.    Assessment & Plan    MDM  Patient presenting with throat issues.  This been going on for 4 months.  She reports occasional difficulty swallowing.  She is tolerating saliva.  There is no stridor.  She has no difficulty breathing.  She has a normal examination.  Strep and viral tests are negative.  She is able to tolerate Tylenol and water here without difficulty.  Her voice is normal.  Very unlikely epiglottitis, retropharyngeal abscess, Ludewig's angina, peritonsillar abscess based on chronicity and clinical exam.  I will defer imaging today.  She has an appointment in 10 days at ENT at an outside facility, they requested a referral here.  Discussed that she likely will not get seen by them however this was made at their request.  Discussed return precautions.  No indication for antibiotics.  Seems to be more related to a globus sensation, possible esophageal stricture, web etc.    I have reviewed the nursing notes. I have reviewed the findings, diagnosis, plan and need for follow up with the patient.    New Prescriptions    No medications on file       Final diagnoses:   Pharyngitis, unspecified etiology       Gutierrez Lamb MD    [facility] EMERGENCY DEPARTMENT  10/7/2023     Gutierrez Lamb MD  10/08/23 0127

## 2023-10-08 NOTE — DISCHARGE INSTRUCTIONS
Please make an appointment to follow up with [facility] (phone: [phone]) as soon as possible.    Return to the emergency department if you develop fevers, difficulty breathing, inability swallowing or if you have any further concerns    If Chrissy has discomfort from fever or other pain, she can have:  Acetaminophen (Tylenol) every 6 hours as needed. Her dose is:    2 regular strength tabs (650 mg)                                     (43.2+ kg/96+ lb)    NOTE: If your acetaminophen (Tylenol) came with a dropper marked with 0.4 and 0.8 ml, call us ([phone]) or check with your doctor about the dose before using it.     AND/OR      Ibuprofen (Advil, Motrin) every 6 hours as needed. His/her dose is:    2 regular strength tabs (400 mg)                                                                         (40-60 kg/ lb)

## 2023-10-08 NOTE — ED TRIAGE NOTES
Pain in throat x 1 week. Trouble swallowing and drinking. Feels as if something is in her throat.     Was seen at urgent care Thursday, was told she does not have strep throat. Was prescribed amoxicillin a urgent care.

## 2023-10-09 ENCOUNTER — VIRTUAL VISIT (OUTPATIENT)
Dept: FAMILY MEDICINE | Facility: CLINIC | Age: 33
End: 2023-10-09
Payer: COMMERCIAL

## 2023-10-09 ENCOUNTER — LAB (OUTPATIENT)
Dept: LAB | Facility: CLINIC | Age: 33
End: 2023-10-09
Payer: COMMERCIAL

## 2023-10-09 DIAGNOSIS — R59.9 SWELLING OF LYMPH NODE: ICD-10-CM

## 2023-10-09 DIAGNOSIS — R07.0 THROAT PAIN: ICD-10-CM

## 2023-10-09 DIAGNOSIS — M54.2 NECK PAIN: Primary | ICD-10-CM

## 2023-10-09 DIAGNOSIS — M54.2 NECK PAIN: ICD-10-CM

## 2023-10-09 LAB
BASO+EOS+MONOS # BLD AUTO: NORMAL 10*3/UL
BASO+EOS+MONOS NFR BLD AUTO: NORMAL %
BASOPHILS # BLD AUTO: 0 10E3/UL (ref 0–0.2)
BASOPHILS NFR BLD AUTO: 0 %
CRP SERPL-MCNC: 3.84 MG/L
EOSINOPHIL # BLD AUTO: 0 10E3/UL (ref 0–0.7)
EOSINOPHIL NFR BLD AUTO: 0 %
ERYTHROCYTE [DISTWIDTH] IN BLOOD BY AUTOMATED COUNT: 14.8 % (ref 10–15)
ERYTHROCYTE [SEDIMENTATION RATE] IN BLOOD BY WESTERGREN METHOD: 32 MM/HR (ref 0–20)
HCT VFR BLD AUTO: 38.7 % (ref 35–47)
HGB BLD-MCNC: 12.4 G/DL (ref 11.7–15.7)
IMM GRANULOCYTES # BLD: 0 10E3/UL
IMM GRANULOCYTES NFR BLD: 0 %
LYMPHOCYTES # BLD AUTO: 2.4 10E3/UL (ref 0.8–5.3)
LYMPHOCYTES NFR BLD AUTO: 32 %
MCH RBC QN AUTO: 28.4 PG (ref 26.5–33)
MCHC RBC AUTO-ENTMCNC: 32 G/DL (ref 31.5–36.5)
MCV RBC AUTO: 89 FL (ref 78–100)
MONOCYTES # BLD AUTO: 0.4 10E3/UL (ref 0–1.3)
MONOCYTES NFR BLD AUTO: 6 %
NEUTROPHILS # BLD AUTO: 4.6 10E3/UL (ref 1.6–8.3)
NEUTROPHILS NFR BLD AUTO: 62 %
PLATELET # BLD AUTO: 309 10E3/UL (ref 150–450)
RBC # BLD AUTO: 4.36 10E6/UL (ref 3.8–5.2)
WBC # BLD AUTO: 7.5 10E3/UL (ref 4–11)

## 2023-10-09 PROCEDURE — 99214 OFFICE O/P EST MOD 30 MIN: CPT | Mod: 95 | Performed by: INTERNAL MEDICINE

## 2023-10-09 PROCEDURE — 86140 C-REACTIVE PROTEIN: CPT

## 2023-10-09 PROCEDURE — 85025 COMPLETE CBC W/AUTO DIFF WBC: CPT

## 2023-10-09 PROCEDURE — 36415 COLL VENOUS BLD VENIPUNCTURE: CPT

## 2023-10-09 PROCEDURE — 85652 RBC SED RATE AUTOMATED: CPT

## 2023-10-09 RX ORDER — PREDNISONE 10 MG/1
TABLET ORAL
Qty: 10 TABLET | Refills: 0 | Status: SHIPPED | OUTPATIENT
Start: 2023-10-09 | End: 2023-11-08

## 2023-10-09 NOTE — PROGRESS NOTES
Chrissy is a 33 year old who is being evaluated via a billable video visit.      How would you like to obtain your AVS? MyChart  If the video visit is dropped, the invitation should be resent by: Text to cell phone: [phone]  Will anyone else be joining your video visit? No          Assessment & Plan   Problem List Items Addressed This Visit    None  Visit Diagnoses       Neck pain    -  Primary    Relevant Medications    predniSONE (DELTASONE) 10 MG tablet    amoxicillin-clavulanate (AUGMENTIN) 875-125 MG tablet    Other Relevant Orders    CBC with platelets and differential    ESR: Erythrocyte sedimentation rate    CRP, inflammation    Throat pain        Relevant Medications    predniSONE (DELTASONE) 10 MG tablet    amoxicillin-clavulanate (AUGMENTIN) 875-125 MG tablet    Other Relevant Orders    CRP, inflammation    Swelling of lymph node        Relevant Medications    amoxicillin-clavulanate (AUGMENTIN) 875-125 MG tablet           Right-sided neck pain swelling, possible underlying reactive lymphadenopathy.  We will give a short course of steroids check inflammatory markers and white count and give another course of antibiotics with Augmentin due to persistent and worsening symptoms.  All questions answered.  Follow-up immediately for any difficulty swallowing or throat tightness fever or worsening symptoms of neck facial swelling.     MED REC REQUIRED  Post Medication Reconciliation Status: discharge medications reconciled, continue medications without change  See Patient Instructions    Janki Anders MD  [facility]    Blade Lowe is a 33 year old, presenting for the following health issues:  Pharyngitis (Hurts to swallow, 2 weeks onset, unable to eat and drink), Headache, Generalized Body Aches (Neck, shoulder,back), and ER F/U        10/9/2023     7:43 AM   Additional Questions   Roomed by Clarisa   Accompanied by Not applicable, by themselves       HPI     ED/UC  Followup:    Facility:  [facility]  Date of visit: 10/7/2023  Reason for visit: Pharyngitis   Current Status: still having pain in throat    Right side pain in neck, right ear pain and temple pain,    No fever, no runny nose.     Throat is still hurting.        Taking ibuprofen, not helping, last 4 days is worst,     Not able to swallow food     Not able to drink. If she tries feel pain, in throat area.     Took antibiotics already, amoxicillin. At walk in clinic in [place]. ANTIBIOTICS DID NOT HELPING    Ibuprofen taking every 4 hours//    No headaches, on right temple, pain in right side of face/ eyes, face,                 Review of Systems   Constitutional, HEENT, cardiovascular, pulmonary, gi and gu systems are negative, except as otherwise noted.      Objective           Vitals:  No vitals were obtained today due to virtual visit.    Physical Exam   GENERAL: Healthy, alert and no distress  EYES: Eyes grossly normal to inspection.  No discharge or erythema, or obvious scleral/conjunctival abnormalities.  RESP: No audible wheeze, cough, or visible cyanosis.  No visible retractions or increased work of breathing.    SKIN: Visible skin clear. No significant rash, abnormal pigmentation or lesions.  NEURO: Cranial nerves grossly intact.  Mentation and speech appropriate for age.  PSYCH: Mentation appears normal, affect normal/bright, judgement and insight intact, normal speech and appearance well-groomed.    Admission on 10/07/2023, Discharged on 10/08/2023   Component Date Value Ref Range Status    Influenza A PCR 10/07/2023 Negative  Negative Final    Influenza B PCR 10/07/2023 Negative  Negative Final    RSV PCR 10/07/2023 Negative  Negative Final    SARS CoV2 PCR 10/07/2023 Negative  Negative Final    NEGATIVE: SARS-CoV-2 (COVID-19) RNA not detected, presumed negative.    Group A strep by PCR 10/07/2023 Not Detected  Not Detected Final             Video-Visit Details    Type of  service:  Video Visit   Video Start Time:  9:02 AM  Video End Time:9:17 AM    Originating Location (pt. Location): Home    Distant Location (provider location):  On-site  Platform used for Video Visit: Robert

## 2023-10-10 NOTE — TELEPHONE ENCOUNTER
FUTURE VISIT INFORMATION      FUTURE VISIT INFORMATION:  Date: 12/7/23  Time: 9AM  Location: [facility]  REFERRAL INFORMATION:  Referring provider:  Gutierrez Stewart MD  Referring providers clinic:  [facility]   Reason for visit/diagnosis  Pharyngitis, unspecified etiology [J02.9], appt per son, ref by Gutierrez Lamb MD  [facility] location verified     RECORDS REQUESTED FROM:       Clinic name Comments Records Status Imaging Status   [facility]  10/7/23- OV SHAWANDA Stewart MD  4/19/23- ED VISIT     PROCEDURE:  8/15/23- PFT EPIC     [facility]  4/19/23- XR CHEST  EPIC  PACS    [place]  8/1/23- CT HEAD BRAIN     OFFICE VISIT:  10/6/23- OV Bismark Hall PA   4/14/23- OV WSanchez Eugene MBBS   2/22/23- OV Karis Rosales PA    CE  PENDING REQ

## 2023-11-08 ENCOUNTER — OFFICE VISIT (OUTPATIENT)
Dept: FAMILY MEDICINE | Facility: CLINIC | Age: 33
End: 2023-11-08
Payer: COMMERCIAL

## 2023-11-08 VITALS
BODY MASS INDEX: 27.32 KG/M2 | OXYGEN SATURATION: 100 % | WEIGHT: 164 LBS | SYSTOLIC BLOOD PRESSURE: 99 MMHG | HEIGHT: 65 IN | TEMPERATURE: 99.7 F | HEART RATE: 84 BPM | RESPIRATION RATE: 100 BRPM | DIASTOLIC BLOOD PRESSURE: 64 MMHG

## 2023-11-08 DIAGNOSIS — R73.09 ELEVATED HEMOGLOBIN A1C: ICD-10-CM

## 2023-11-08 DIAGNOSIS — Z32.01 PREGNANCY TEST POSITIVE: ICD-10-CM

## 2023-11-08 DIAGNOSIS — N92.6 MISSED PERIOD: Primary | ICD-10-CM

## 2023-11-08 LAB
ALBUMIN UR-MCNC: NEGATIVE MG/DL
APPEARANCE UR: CLEAR
BACTERIA #/AREA URNS HPF: ABNORMAL /HPF
BASOPHILS # BLD AUTO: 0 10E3/UL (ref 0–0.2)
BASOPHILS NFR BLD AUTO: 0 %
BILIRUB UR QL STRIP: NEGATIVE
COLOR UR AUTO: YELLOW
EOSINOPHIL # BLD AUTO: 0 10E3/UL (ref 0–0.7)
EOSINOPHIL NFR BLD AUTO: 0 %
ERYTHROCYTE [DISTWIDTH] IN BLOOD BY AUTOMATED COUNT: 15.3 % (ref 10–15)
FERRITIN SERPL-MCNC: 9 NG/ML (ref 6–175)
GLUCOSE UR STRIP-MCNC: NEGATIVE MG/DL
HBA1C MFR BLD: 5.6 % (ref 0–5.6)
HCG UR QL: POSITIVE
HCT VFR BLD AUTO: 38.1 % (ref 35–47)
HGB BLD-MCNC: 11.7 G/DL (ref 11.7–15.7)
HGB UR QL STRIP: NEGATIVE
HOLD SPECIMEN: NORMAL
IMM GRANULOCYTES # BLD: 0 10E3/UL
IMM GRANULOCYTES NFR BLD: 0 %
KETONES UR STRIP-MCNC: NEGATIVE MG/DL
LEUKOCYTE ESTERASE UR QL STRIP: NEGATIVE
LYMPHOCYTES # BLD AUTO: 2.1 10E3/UL (ref 0.8–5.3)
LYMPHOCYTES NFR BLD AUTO: 35 %
MCH RBC QN AUTO: 27.7 PG (ref 26.5–33)
MCHC RBC AUTO-ENTMCNC: 30.7 G/DL (ref 31.5–36.5)
MCV RBC AUTO: 90 FL (ref 78–100)
MONOCYTES # BLD AUTO: 0.4 10E3/UL (ref 0–1.3)
MONOCYTES NFR BLD AUTO: 6 %
NEUTROPHILS # BLD AUTO: 3.5 10E3/UL (ref 1.6–8.3)
NEUTROPHILS NFR BLD AUTO: 58 %
NITRATE UR QL: NEGATIVE
PH UR STRIP: 6.5 [PH] (ref 5–7)
PLATELET # BLD AUTO: 286 10E3/UL (ref 150–450)
RBC # BLD AUTO: 4.22 10E6/UL (ref 3.8–5.2)
RBC #/AREA URNS AUTO: ABNORMAL /HPF
SP GR UR STRIP: 1.02 (ref 1–1.03)
UROBILINOGEN UR STRIP-ACNC: 0.2 E.U./DL
WBC # BLD AUTO: 6.1 10E3/UL (ref 4–11)
WBC #/AREA URNS AUTO: ABNORMAL /HPF

## 2023-11-08 PROCEDURE — 82728 ASSAY OF FERRITIN: CPT

## 2023-11-08 PROCEDURE — 99214 OFFICE O/P EST MOD 30 MIN: CPT

## 2023-11-08 PROCEDURE — 83036 HEMOGLOBIN GLYCOSYLATED A1C: CPT

## 2023-11-08 PROCEDURE — 85025 COMPLETE CBC W/AUTO DIFF WBC: CPT

## 2023-11-08 PROCEDURE — 87340 HEPATITIS B SURFACE AG IA: CPT

## 2023-11-08 PROCEDURE — 81001 URINALYSIS AUTO W/SCOPE: CPT

## 2023-11-08 PROCEDURE — 36415 COLL VENOUS BLD VENIPUNCTURE: CPT

## 2023-11-08 PROCEDURE — 81025 URINE PREGNANCY TEST: CPT

## 2023-11-08 NOTE — PATIENT INSTRUCTIONS
Claritin (loratidine) ok during pregnancy - use lowest dose possible to help your symptoms.   - if taking for itching skin you could try aquaphor ointment on itchy areas    Continue your multivitamin  Add iron supplement 1-2 days per week (low dose, over the counter)    Schedule pregnancy appointment with OBGYN

## 2023-11-08 NOTE — PROGRESS NOTES
Assessment & Plan     Missed period  - UA with Microscopic reflex to Culture - lab collect  - HCG qualitative urine  - UA Microscopic with Reflex to Culture  - Hemoglobin A1c  - Hemoglobin A1c    Pregnancy test positive  Pt happy with results. 10 kids at home, taking multivitamin, not a prenatal but it does have folic acid. Advised scheduling with OBGYN for upcoming prenatal care.   - CBC with platelets and differential  - Ferritin  - Hepatitis B surface antigen    Elevated hemoglobin A1c  Pre-diabetes range with last labs, pt has been cutting out carbs since then, rechecking today.   - Hemoglobin A1c    Due date of pregnancy would be July 7, 2023.   Last date of delivery 10/15/2022.     Ordering of each unique test  Prescription drug management         See Patient Instructions    KENROY Lau CNP  [facility]    Blade Lowe is a 33 year old, presenting for the following health issues:  Back Pain and Amenorrhea        11/8/2023     8:33 AM   Additional Questions   Roomed by Doretha KAPLAN MA   Accompanied by n/a         11/8/2023     8:33 AM   Patient Reported Additional Medications   Patient reports taking the following new medications none       History of Present Illness       Reason for visit:  Anthony    She eats 0-1 servings of fruits and vegetables daily.She consumes 1 sweetened beverage(s) daily.She exercises with enough effort to increase her heart rate 9 or less minutes per day.    She is taking medications regularly.     Wt Readings from Last 5 Encounters:   11/08/23 74.4 kg (164 lb)   09/28/23 77 kg (169 lb 12.8 oz)   08/16/23 81.1 kg (178 lb 14.4 oz)   08/11/23 81.4 kg (179 lb 6.4 oz)   07/28/23 84.3 kg (185 lb 12.8 oz)          Pain History:  When did you first notice your pain? 2 weeks   Have you seen anyone else for your pain? No  How has your pain affected your ability to work? Not applicable  Where in your body do you have pain? Back Pain  Onset/Duration: 2  "steve  Description:   Location of pain: middle of back bilateral  Character of pain: cramping  Pain radiation: none  New numbness or weakness in legs, not attributed to pain: no   Intensity: Currently 7/10  Progression of Symptoms: same  History:   Specific cause: none  Pain interferes with job: N/A  History of back problems: no prior back problems  Any previous MRI or X-rays: None  Sees a specialist for back pain: No  Alleviating factors:   Improved by: sleeping    Precipitating factors:  Worsened by: Sitting  Therapies tried and outcome: acetaminophen (Tylenol); effective        Genitourinary - Female  Onset/Duration: 2 weeks  Description:   Painful urination (Dysuria): YES           Frequency: No  Blood in urine (Hematuria): No  Delay in urine (Hesitency): No  Intensity: severe  Progression of Symptoms:  same  Accompanying Signs & Symptoms:  Fever/chills: No  Flank pain: No  Nausea and vomiting: YES- nausea  Vaginal symptoms: none  Abdominal/Pelvic Pain: YES  History:   History of frequent UTI s: No  History of kidney stones: No  Sexually Active: YES  Possibility of pregnancy: No, but patient mentioned she has not had her period yet. LMP was 9/28/23.  Precipitating or alleviating factors: None  Therapies tried and outcome: OTC advil or tylenol     9/29 was last period  Missed period/expected around 10/29.   +sexually active, not trying for pregnancy but not using contraception.   Last delivery was 10/2022.     Review of Systems   Constitutional, HEENT, cardiovascular, pulmonary, gi and gu systems are negative, except as otherwise noted.      Objective    BP 99/64 (BP Location: Right arm, Patient Position: Chair, Cuff Size: Adult Regular)   Pulse 84   Temp 99.7  F (37.6  C) (Oral)   Resp (!) 100   Ht 1.651 m (5' 5\")   Wt 74.4 kg (164 lb)   LMP 09/28/2023 (Exact Date)   SpO2 100%   BMI 27.29 kg/m    Body mass index is 27.29 kg/m .  Physical Exam   GENERAL: healthy, alert and no distress  NECK: no adenopathy, " no asymmetry, masses, or scars and thyroid normal to palpation  RESP: lungs clear to auscultation - no rales, rhonchi or wheezes  CV: regular rate and rhythm, normal S1 S2, no S3 or S4, no murmur, click or rub, no peripheral edema and peripheral pulses strong  ABDOMEN: soft, nontender, no hepatosplenomegaly, no masses and bowel sounds normal  MS: no gross musculoskeletal defects noted, no edema    Results for orders placed or performed in visit on 11/08/23 (from the past 24 hour(s))   UA with Microscopic reflex to Culture - lab collect    Specimen: Urine, Midstream   Result Value Ref Range    Color Urine Yellow Colorless, Straw, Light Yellow, Yellow    Appearance Urine Clear Clear    Glucose Urine Negative Negative mg/dL    Bilirubin Urine Negative Negative    Ketones Urine Negative Negative mg/dL    Specific Gravity Urine 1.020 1.003 - 1.035    Blood Urine Negative Negative    pH Urine 6.5 5.0 - 7.0    Protein Albumin Urine Negative Negative mg/dL    Urobilinogen Urine 0.2 0.2, 1.0 E.U./dL    Nitrite Urine Negative Negative    Leukocyte Esterase Urine Negative Negative   HCG qualitative urine   Result Value Ref Range    hCG Urine Qualitative Positive (A) Negative   UA Microscopic with Reflex to Culture   Result Value Ref Range    Bacteria Urine Few (A) None Seen /HPF    RBC Urine None Seen 0-2 /HPF /HPF    WBC Urine None Seen 0-5 /HPF /HPF    Narrative    Urine Culture not indicated   CBC with platelets and differential    Narrative    The following orders were created for panel order CBC with platelets and differential.  Procedure                               Abnormality         Status                     ---------                               -----------         ------                     CBC with platelets and d...[[id number]]  Abnormal            Final result                 Please view results for these tests on the individual orders.   Extra Tube    Narrative    The following orders were created for panel  order Extra Tube.  Procedure                               Abnormality         Status                     ---------                               -----------         ------                     Extra Blue Top Tube[[id number]]                              Final result               Extra Red Top Tube[[id number]]                               Final result               Extra Green Top (Lithium...[[id number]]                      Final result               Extra Purple Top Tube[[id number]]                            Final result                 Please view results for these tests on the individual orders.   Hemoglobin A1c   Result Value Ref Range    Hemoglobin A1C 5.6 0.0 - 5.6 %   CBC with platelets and differential   Result Value Ref Range    WBC Count 6.1 4.0 - 11.0 10e3/uL    RBC Count 4.22 3.80 - 5.20 10e6/uL    Hemoglobin 11.7 11.7 - 15.7 g/dL    Hematocrit 38.1 35.0 - 47.0 %    MCV 90 78 - 100 fL    MCH 27.7 26.5 - 33.0 pg    MCHC 30.7 (L) 31.5 - 36.5 g/dL    RDW 15.3 (H) 10.0 - 15.0 %    Platelet Count 286 150 - 450 10e3/uL    % Neutrophils 58 %    % Lymphocytes 35 %    % Monocytes 6 %    % Eosinophils 0 %    % Basophils 0 %    % Immature Granulocytes 0 %    Absolute Neutrophils 3.5 1.6 - 8.3 10e3/uL    Absolute Lymphocytes 2.1 0.8 - 5.3 10e3/uL    Absolute Monocytes 0.4 0.0 - 1.3 10e3/uL    Absolute Eosinophils 0.0 0.0 - 0.7 10e3/uL    Absolute Basophils 0.0 0.0 - 0.2 10e3/uL    Absolute Immature Granulocytes 0.0 <=0.4 10e3/uL   Extra Blue Top Tube   Result Value Ref Range    Hold Specimen JIC    Extra Red Top Tube   Result Value Ref Range    Hold Specimen JIC    Extra Green Top (Lithium Heparin) Tube   Result Value Ref Range    Hold Specimen JIC    Extra Purple Top Tube   Result Value Ref Range    Hold Specimen JIC

## 2023-11-08 NOTE — LETTER
November 8, 2023      Chrissy Hernandez  [address] [place] [address]        To Whom It May Concern:    Chrissy Hernandez  was seen on 11/8/23.  Her pregnancy test is positive.         Sincerely,        KENROY Lau CNP

## 2023-11-09 ENCOUNTER — TELEPHONE (OUTPATIENT)
Dept: FAMILY MEDICINE | Facility: CLINIC | Age: 33
End: 2023-11-09
Payer: COMMERCIAL

## 2023-11-09 LAB — HBV SURFACE AG SERPL QL IA: NONREACTIVE

## 2023-11-09 NOTE — TELEPHONE ENCOUNTER
Pls call pt, she will need OBGYN for current pregnancy.   Let her know Dr. Barton at [facility] does OB Care. Make sure she knows to schedule OB RN appointment and then first OB with Dr. Barton.     Pregnancy test + 23.   prior to this pregnancy

## 2023-11-09 NOTE — TELEPHONE ENCOUNTER
Called patient and left a detailed voicemail message and relayed Loyd Dempsey message below.    JACQUELYN Bowman  [facility]

## 2023-11-10 NOTE — TELEPHONE ENCOUNTER
Called patient and scheduled her RN OB intake appointment.  Patient hung up before I could explain about Dr Barton doing OBGYN appointments here at [facility].    JACQUELYN Bowman  [facility]

## 2023-11-21 ENCOUNTER — LAB (OUTPATIENT)
Dept: LAB | Facility: CLINIC | Age: 33
End: 2023-11-21
Payer: COMMERCIAL

## 2023-11-21 ENCOUNTER — PRENATAL OFFICE VISIT (OUTPATIENT)
Dept: OBGYN | Facility: CLINIC | Age: 33
End: 2023-11-21
Payer: COMMERCIAL

## 2023-11-21 ENCOUNTER — TELEPHONE (OUTPATIENT)
Dept: OBGYN | Facility: CLINIC | Age: 33
End: 2023-11-21

## 2023-11-21 DIAGNOSIS — Z83.3 FAMILY HISTORY OF DIABETES MELLITUS IN MOTHER: ICD-10-CM

## 2023-11-21 DIAGNOSIS — Z23 NEED FOR TDAP VACCINATION: ICD-10-CM

## 2023-11-21 DIAGNOSIS — Z92.89 HISTORY OF BLOOD TRANSFUSION: ICD-10-CM

## 2023-11-21 DIAGNOSIS — O09.529 ENCOUNTER FOR SUPERVISION OF HIGH RISK MULTIGRAVIDA OF ADVANCED MATERNAL AGE, ANTEPARTUM: Primary | ICD-10-CM

## 2023-11-21 DIAGNOSIS — O09.529 ENCOUNTER FOR SUPERVISION OF HIGH RISK MULTIGRAVIDA OF ADVANCED MATERNAL AGE, ANTEPARTUM: ICD-10-CM

## 2023-11-21 LAB
ALBUMIN UR-MCNC: NEGATIVE MG/DL
APPEARANCE UR: CLEAR
BILIRUB UR QL STRIP: NEGATIVE
COLOR UR AUTO: YELLOW
GLUCOSE UR STRIP-MCNC: NEGATIVE MG/DL
HCG INTACT+B SERPL-ACNC: ABNORMAL MIU/ML
HGB UR QL STRIP: NEGATIVE
KETONES UR STRIP-MCNC: NEGATIVE MG/DL
LEUKOCYTE ESTERASE UR QL STRIP: NEGATIVE
NITRATE UR QL: NEGATIVE
PH UR STRIP: 7.5 [PH] (ref 5–7)
SP GR UR STRIP: 1.01 (ref 1–1.03)
UROBILINOGEN UR STRIP-ACNC: 0.2 E.U./DL
VIT D+METAB SERPL-MCNC: 36 NG/ML (ref 20–50)

## 2023-11-21 PROCEDURE — 86803 HEPATITIS C AB TEST: CPT

## 2023-11-21 PROCEDURE — 99207 PR NO CHARGE NURSE ONLY: CPT

## 2023-11-21 PROCEDURE — 82306 VITAMIN D 25 HYDROXY: CPT

## 2023-11-21 PROCEDURE — 81003 URINALYSIS AUTO W/O SCOPE: CPT

## 2023-11-21 PROCEDURE — 36415 COLL VENOUS BLD VENIPUNCTURE: CPT

## 2023-11-21 PROCEDURE — 84702 CHORIONIC GONADOTROPIN TEST: CPT

## 2023-11-21 RX ORDER — CETIRIZINE HYDROCHLORIDE 10 MG/1
1 TABLET ORAL DAILY
COMMUNITY
Start: 2023-08-31 | End: 2023-11-21

## 2023-11-21 RX ORDER — CYCLOBENZAPRINE HCL 10 MG
10 TABLET ORAL
COMMUNITY
Start: 2023-10-24 | End: 2023-11-21

## 2023-11-21 RX ORDER — METHYLPREDNISOLONE 4 MG
TABLET, DOSE PACK ORAL
COMMUNITY
Start: 2023-08-31 | End: 2023-11-21

## 2023-11-21 NOTE — TELEPHONE ENCOUNTER
Patient is requesting prenatal vitamins, B6, Unisom, Zofran to the updated pharmacy    This is the patient's 12th pregnancy with 10 living children. She states that she took Zofran with her previous pregnancy. Took unisom at night    Patient had a PE in 2018 , states she was not pregnant at the time but her pregnancies after that she was on Lovenox    Needs a referral to [facility]..      Addie Peng LPN

## 2023-11-21 NOTE — PROGRESS NOTES
Important Information for Provider:     New ob nurse intake by phone, twelfth pregnancy, AMA . History of 1 SAB . Recent delivery 10/15/2022 via . She moved here from [facility].  Patient has a history of PE 2018. During her pregnancies she was taking Lovenox. TE sent to Dr Kirk to send MFM consult/Rx to the pharmacy  Patient had some spotting, ultrasound scheduled for 2023. NOB with Dr Kirk  2023  She came into clinic today thought it was in person visit                Prenatal OB Questionnaire  Patient supplied answers from flow sheet for:  Prenatal OB Questionnaire.  Past Medical History  Have you ever received care for your mental health? : No  Have you ever been in a major accident or suffered serious trauma?: No  Within the last year, has anyone hit, slapped, kicked or otherwise hurt you?: No  In the last year, has anyone forced you to have sex when you didn't want to?: No    Past Medical History 2   Have you ever received a blood transfusion?: (!) Yes ,  Would you accept a blood transfusion if was medically recommended?: Unknown  Does anyone in your home smoke?: No   Is your blood type Rh negative?: Unknown  Have you ever ?: (!) Yes  Have you been hospitalized for a nonsurgical reason excluding normal delivery?: (!) Yes  Have you ever had an abnormal pap smear?: (!) Yes    Past Medical History (Continued)  Do you have a history of abnormalities of the uterus?: No  Did your mother take FABI or any other hormones when she was pregnant with you?: No  Do you have any other problems we have not asked about which you feel may be important to this pregnancy?: No                       Allergies as of 2023:    Allergies as of 2023 - Reviewed 2023   Allergen Reaction Noted    Egg solids, whole Itching 2010           Early ultrasound screening tool:    Does patient have irregular periods?  No  Did patient use hormonal birth control in the three months  prior to positive urine pregnancy test? No  Is the patient breastfeeding?  No  Is the patient 10 weeks or greater at time of education visit?  No

## 2023-11-22 DIAGNOSIS — O09.529 ENCOUNTER FOR SUPERVISION OF HIGH RISK MULTIGRAVIDA OF ADVANCED MATERNAL AGE, ANTEPARTUM: Primary | ICD-10-CM

## 2023-11-22 LAB — HCV AB SERPL QL IA: NONREACTIVE

## 2023-11-22 RX ORDER — PYRIDOXINE HCL (VITAMIN B6) 25 MG
25 TABLET ORAL 3 TIMES DAILY
Qty: 90 TABLET | Refills: 3 | Status: ON HOLD | OUTPATIENT
Start: 2023-11-22 | End: 2024-07-03

## 2023-11-22 RX ORDER — ONDANSETRON 4 MG/1
4 TABLET, ORALLY DISINTEGRATING ORAL EVERY 8 HOURS PRN
Qty: 30 TABLET | Refills: 3 | Status: SHIPPED | OUTPATIENT
Start: 2023-11-22

## 2023-11-22 RX ORDER — OMEPRAZOLE 20 MG/1
20 TABLET, DELAYED RELEASE ORAL 2 TIMES DAILY
Qty: 60 TABLET | Refills: 3 | Status: SHIPPED | OUTPATIENT
Start: 2023-11-22

## 2023-11-22 NOTE — PROGRESS NOTES
[facility]  [address] [place] [id number]  Phone: [phone], Fax: [phone]    Outpatient Visit Note:    Patient: Chrissy Hernandez  MRN: [id number]  : 1990  MEGHANN: 2023    Reason for Consultation:  Chrissy Hernandez is a referred for evaluation of history of venous thromboembolism, currently pregnant.    Assessment:  In summary, Chrissy Hernandez is a 33 year old  female with history of travel provoked pulmonary embolism in 2018 following prolonged travel to [facility] and back. During her travels she developed bilateral lower extremity pain and swelling. Her symptoms progressed upon returning and eventually she developed pleurisy, palpitations and dyspnea prompting CT scan which revealed right lower lobe pulmonary embolism. She was treated for 3 months with Xarelto and follow up imaging was negative for residual thrombus. No lower extremity US was ever completed to assess for DVT. For subsequent pregnancies, she has been placed on postpartum Lovenox for 6 weeks at 40mg subcutaneous for venous thromboembolism prophylaxis. She continues to participate in long distance travel but does not have a prescriber for anticoagulants for venous thromboembolism prophylaxis. She is currently 8 weeks pregnant. She plans on traveling to [facility] from January to 2024 and then returning. Her COSMO is in 2024. Her hypercoagulable work up was negative for inheritable thrombophilia. There is no family history of venous thromboembolism.     Plan:  Majority of today's visit was spent counseling the patient regarding provoked VTE including pathophysiology, risk factors, anticoagulation options, risks/benefits and duration of therapy.   Given her prior history of travel provoked venous thromboembolism, recommend anticoagulation at prophylactic intensity prior to long distance travel > 4-6 hours including air, train, bus, or car travel.   For upcoming [facility] trip:   Wear  compression socks/stockings during days of travel or if you have swelling  Take Lovenox 40mg injection just prior to travel, take another dose 24 hours later.   Prior to your train travel, take a dose of Lovenox. Take again prior to your return train trip.   For travel from [facility] back to [place], take Lovenox 40mg just prior to travel and take another dose 24 hours later.   Travel letter was provided  I also recommend venous thromboembolism prophylaxis for 6 weeks postpartum. She plans to nurse thus I recommend Lovenox at 40mg subcutaneous x 6 weeks postpartum. Please discharge her with prescription.   She was prescribed compression garments to wear during travel, during pregnancy, and postpartum.     The patient is given our center's contact information and is instructed to call if she should have any further questions or concerns.  Otherwise, we will plan on seeing her back in 1 year as she will need ongoing travel venous thromboembolism prophylaxis.  For future travel, if not pregnant, would use Xarelto 10mg.    Patient understands and agrees with the above plan and recommendation.      Rosalinda Prado, MPH, PA-C  [facility]        ----------------------------------------------------------------------------------------------------------------------  History of Present Illness:  Chrissy Hernandez is a 33 year old  female with past medical history significant for pulmonary embolism in 2018. She is currently about 8-9 weeks pregnant (dating US not yet complete) and presents today to discuss need for anticoagulation for venous thromboembolism prophylaxis antepartum/postpartum.     Ms. Hernandez presented to the ED on 2018 for evaluation of dyspnea and palpitations that had been going on for over a month. A D dimer was elevated. She had CT that's howed a small acute pulmonary embolism in the right lower lobe. Per the notes, this was attributed to being  on depoprovera  however she had only received a single dose of this back in 2017 thus likely non contributory. She was not pregnant at time of PE. Per her report she had traveled to [facility] three months prior in 2018 and had long periods of immobilization and symptomatic bilateral lower extremity swelling and pain. Her trip included 8 hour flight to [place], then additional 8 hours to [facility]. While in [facility], she took 10 hour each way bus trip and had similar return flight. She noted bilateral lower extremity pain and swelling that started during her trip and did not resolve promptly. No US was completed at time of event to determine if DVT was present. Hypercoagulable work up was completed in the ED and was negative for factor V Leiden, prothrombin gene mutation, protein C/S/antithrombin III deficiencies. She was anticoagulated with Xarelto for three months.  Follow up imaging in 10/2018 showed resolution of pulmonary embolism and anticoagulation was discontinued.     She has had three pregnancies and deliveries since her PE diagnosis. Each time, she was placed on VTE prophylaxis with Lovenox 40mg once daily subcutaneous for 6 weeks postpartum. She has not been prescribed anticoagulation antepartum. She also frequently travels back to [facility] and has a trip planned in January -2024. She has never been prescribed anticoagulation for these trips. She notes feeling anxious and nervous about recurrent venous thromboembolism as she gets a lot of swelling. She does not have compression garments. She denies any lower extremity varicosities.     She denies tobacco or alcohol use. She is up to date with her age appropriate malignancy screenings. She notes that her prior surgeries include  delivery in  and right wrist surgery in  that were uncomplicated. She denies any family history of venous thromboembolism.     She reports history of one miscarriage around 10 weeks gestation in .     She  denies any bleeding complications with Lovenox and is comfortable with self administration. She did have spotting early in pregnancy but no longer having symptoms. She does have history of PPH requiring blood transfusion and iron in 2016 and 2021. Denies history of other bleeding symptoms. Has normal baseline INR and aPTT.    Past Medical History:  Past Medical History:   Diagnosis Date    Major depressive disorder with single episode, in full remission (H24) 12/04/2012    Formatting of this note might be different from the original. Overview: Postpartum onset, severe, 2012, hospitalized Formatting of this note might be different from the original. Postpartum onset, severe fall 2012, hospitalized Formatting of this note might be different from the original. Overview: Postpartum onset, severe, 2012, hospitalized    Wrist tendonitis 11/13/2008       Past Surgical History:  Past Surgical History:   Procedure Laterality Date    BIOPSY CERVICAL, LOCAL EXCISION, SINGLE/MULTIPLE      C/SECTION, CLASSICAL  2021       Medications:  Current Outpatient Medications   Medication Sig Dispense Refill    doxylamine (UNISOM SLEEPTABS) 25 MG TABS tablet Take 0.5 tablets (12.5 mg) by mouth at bedtime 90 tablet 3    enoxaparin ANTICOAGULANT (LOVENOX) 40 MG/0.4ML syringe Inject 0.4 mLs (40 mg) Subcutaneous daily as needed (prior to long distance travel) 4.8 mL 0    omeprazole (PRILOSEC OTC) 20 MG EC tablet Take 1 tablet (20 mg) by mouth 2 times daily 60 tablet 3    ondansetron (ZOFRAN ODT) 4 MG ODT tab Take 1 tablet (4 mg) by mouth every 8 hours as needed for nausea 30 tablet 3    pyridOXINE (VITAMIN B6) 25 MG tablet Take 1 tablet (25 mg) by mouth 3 times daily 90 tablet 3        Allergies:  Allergies   Allergen Reactions    Egg Solids, Whole Itching       ROS:  Remainder of a comprehensive 14 point ROS is negative unless noted above.  +nausea, emesis, paronychia of finger    Social History:  Denies any tobacco use. No significant  alcohol use. Denies any illicit drug use.     Family History:  Denies any family history of bleeding or clotting disorders     Objectives:  Vitals: BP 93/63 (BP Location: Right arm, Patient Position: Sitting, Cuff Size: Adult Large)   Pulse 82   Temp 98.7  F (37.1  C) (Tympanic)   Wt 75.6 kg (166 lb 11.2 oz)   LMP 09/29/2023   SpO2 100%   BMI 27.74 kg/m     Exam:   Constitutional: Appears well, no distress  HEENT: Pupils equal and round. No scleral icterus or hemorrhage.   Respiratory: no increased work of breathing.   Mus/Skele: no edema of lower extremities. No varicosities, striae noted of right popliteal region.  Skin: no petechiae, no ecchymosis on exposed dermis.  Neuro: CN II-XII intact. Normal gait. AOx3      Labs:  CBC RESULTS:   Recent Labs   Lab Test 11/08/23  1003   WBC 6.1   RBC 4.22   HGB 11.7   HCT 38.1   MCV 90   MCH 27.7   MCHC 30.7*   RDW 15.3*        Last Comprehensive Metabolic Panel:  Sodium   Date Value Ref Range Status   07/27/2023 139 136 - 145 mmol/L Final     Potassium   Date Value Ref Range Status   07/27/2023 4.0 3.4 - 5.3 mmol/L Final     Chloride   Date Value Ref Range Status   07/27/2023 106 98 - 107 mmol/L Final     Carbon Dioxide (CO2)   Date Value Ref Range Status   07/27/2023 23 22 - 29 mmol/L Final     Anion Gap   Date Value Ref Range Status   07/27/2023 10 7 - 15 mmol/L Final     Glucose   Date Value Ref Range Status   07/27/2023 95 70 - 99 mg/dL Final     Urea Nitrogen   Date Value Ref Range Status   07/27/2023 8.0 6.0 - 20.0 mg/dL Final     Creatinine   Date Value Ref Range Status   07/27/2023 0.45 (L) 0.51 - 0.95 mg/dL Final     GFR Estimate   Date Value Ref Range Status   07/27/2023 >90 >60 mL/min/1.73m2 Final     Calcium   Date Value Ref Range Status   07/27/2023 8.9 8.6 - 10.0 mg/dL Final     Liver Function Studies -   Recent Labs   Lab Test 07/27/23  0150   PROTTOTAL 6.8   ALBUMIN 3.6   BILITOTAL 0.2   ALKPHOS 65   AST 17   ALT 10         Imaging:    CT CHEST  PE STUDY 7/14/2018  IMPRESSION:   Small acute pulmonary embolus is present in the right lower lobe. Remainder of the chest is normal.

## 2023-11-24 ENCOUNTER — TELEPHONE (OUTPATIENT)
Dept: OBGYN | Facility: CLINIC | Age: 33
End: 2023-11-24
Payer: COMMERCIAL

## 2023-11-24 NOTE — TELEPHONE ENCOUNTER
Patient scheduled for NOB on 12/17.  No showed US appointment on 11/21 and 11/22  Nhi will plan to put in for an US closer to her visit on 12/17       Olinda Clement RN

## 2023-11-28 ENCOUNTER — OFFICE VISIT (OUTPATIENT)
Dept: HEMATOLOGY | Facility: CLINIC | Age: 33
End: 2023-11-28
Payer: COMMERCIAL

## 2023-11-28 VITALS
WEIGHT: 166.7 LBS | BODY MASS INDEX: 27.74 KG/M2 | DIASTOLIC BLOOD PRESSURE: 63 MMHG | HEART RATE: 82 BPM | SYSTOLIC BLOOD PRESSURE: 93 MMHG | OXYGEN SATURATION: 100 % | TEMPERATURE: 98.7 F

## 2023-11-28 DIAGNOSIS — Z86.718 PERSONAL HISTORY OF DVT (DEEP VEIN THROMBOSIS): Primary | ICD-10-CM

## 2023-11-28 DIAGNOSIS — O09.529 ENCOUNTER FOR SUPERVISION OF HIGH RISK MULTIGRAVIDA OF ADVANCED MATERNAL AGE, ANTEPARTUM: ICD-10-CM

## 2023-11-28 DIAGNOSIS — R60.0 LOWER EXTREMITY EDEMA: ICD-10-CM

## 2023-11-28 PROCEDURE — 99204 OFFICE O/P NEW MOD 45 MIN: CPT | Performed by: PHYSICIAN ASSISTANT

## 2023-11-28 PROCEDURE — G0463 HOSPITAL OUTPT CLINIC VISIT: HCPCS | Performed by: PHYSICIAN ASSISTANT

## 2023-11-28 RX ORDER — ENOXAPARIN SODIUM 100 MG/ML
40 INJECTION SUBCUTANEOUS DAILY PRN
Qty: 4.8 ML | Refills: 0 | Status: ON HOLD | OUTPATIENT
Start: 2023-11-28 | End: 2024-07-03

## 2023-11-28 NOTE — LETTER
Patient:  Chrissy Hernandez  :   1990  Address: [address] [place] [address]    To Whom it may concern    The above named individual will be traveling soon. Chrissy Hernandez is being followed at our clinic for management of blood clotting disorder requiring specialized medications for her treatment. This treament includes the use of Lovenox which is given as a subcutaneous injection. This requires Ms. Hernandez to travel with syringes or auto-injection devices in order for Chrissy Hernandez to administer the injections to herself while traveling.       Thank you for your consideration.      Sincerely,      TRINO LÓPEZ PA-C

## 2023-11-28 NOTE — PATIENT INSTRUCTIONS
[facility]  [facility]  [facility], [address], [place] [address]  Main: [phone], Fax: [phone]    Chrissy,   It was a pleasure seeing you today.  Thank you for allowing us to be involved in your care.  Please let us know if there is anything else we can do for you, so that we can be sure you are leaving completely satisfied with your care experience. Below is the plan that we discussed.     For travel:   Wear compression socks/stockings   Take Lovenox 40mg injection just prior to travel, take another dose 24 hours later.   Prior to your train travel, take a dose of Lovenox. Take again prior to your return trip.   For travel from [facility] back to [place], take Lovenox 40mg just prior to travel and take another dose 24 hours later.   2. After you deliver baby, will start Lovenox 40mg once daily x 6 weeks.   3. Please  compression stockings - if you wish to purchase on [employer] - get knee high stockings with 20-30mmHg pressure. Wear them during pregnancy during the day if you have swelling and during days of travel.     We would like a provider on our team to see you at least annually for optimal care and to allow us to continue to prescribe for you.      Return to clinic in 1 year.     If you have questions or concerns, please don't hesitate to send a 72xuan Message for non urgent matters or contact my nurse clinician, Diamante Haas at [phone]. If they are unavailable and you have immediate concerns, please call [phone] and ask for a nurse.     Rosalinda Prado, MPH, PA-C  [facility]

## 2023-12-07 ENCOUNTER — PRE VISIT (OUTPATIENT)
Dept: OTOLARYNGOLOGY | Facility: CLINIC | Age: 33
End: 2023-12-07

## 2023-12-13 LAB
ABO/RH(D): NORMAL
ANTIBODY SCREEN: NEGATIVE
SPECIMEN EXPIRATION DATE: NORMAL

## 2023-12-14 ENCOUNTER — ANCILLARY PROCEDURE (OUTPATIENT)
Dept: ULTRASOUND IMAGING | Facility: CLINIC | Age: 33
End: 2023-12-14
Payer: COMMERCIAL

## 2023-12-14 ENCOUNTER — PRENATAL OFFICE VISIT (OUTPATIENT)
Dept: OBGYN | Facility: CLINIC | Age: 33
End: 2023-12-14
Payer: COMMERCIAL

## 2023-12-14 VITALS
OXYGEN SATURATION: 100 % | HEART RATE: 80 BPM | WEIGHT: 169.3 LBS | DIASTOLIC BLOOD PRESSURE: 73 MMHG | BODY MASS INDEX: 28.17 KG/M2 | SYSTOLIC BLOOD PRESSURE: 118 MMHG

## 2023-12-14 DIAGNOSIS — R73.03 PRE-DIABETES: Primary | ICD-10-CM

## 2023-12-14 DIAGNOSIS — O09.529 ENCOUNTER FOR SUPERVISION OF HIGH RISK MULTIGRAVIDA OF ADVANCED MATERNAL AGE, ANTEPARTUM: ICD-10-CM

## 2023-12-14 LAB
HBA1C MFR BLD: 5.7 % (ref 0–5.6)
RUBV IGG SERPL QL IA: 7.48 INDEX
RUBV IGG SERPL QL IA: POSITIVE
T PALLIDUM AB SER QL: NONREACTIVE

## 2023-12-14 PROCEDURE — 36415 COLL VENOUS BLD VENIPUNCTURE: CPT

## 2023-12-14 PROCEDURE — 86762 RUBELLA ANTIBODY: CPT

## 2023-12-14 PROCEDURE — 99213 OFFICE O/P EST LOW 20 MIN: CPT

## 2023-12-14 PROCEDURE — 86900 BLOOD TYPING SEROLOGIC ABO: CPT

## 2023-12-14 PROCEDURE — 86901 BLOOD TYPING SEROLOGIC RH(D): CPT

## 2023-12-14 PROCEDURE — 83036 HEMOGLOBIN GLYCOSYLATED A1C: CPT

## 2023-12-14 PROCEDURE — 87389 HIV-1 AG W/HIV-1&-2 AB AG IA: CPT

## 2023-12-14 PROCEDURE — 86780 TREPONEMA PALLIDUM: CPT

## 2023-12-14 PROCEDURE — 76801 OB US < 14 WKS SINGLE FETUS: CPT | Performed by: OBSTETRICS & GYNECOLOGY

## 2023-12-14 PROCEDURE — 86850 RBC ANTIBODY SCREEN: CPT

## 2023-12-14 PROCEDURE — 87086 URINE CULTURE/COLONY COUNT: CPT

## 2023-12-14 NOTE — PROGRESS NOTES
SUBJECTIVE:     HPI:    This is a 33 year old female patient,  who presents for her first obstetrical visit.    COSMO: 2024, by Last Menstrual Period.  She is 10w6d weeks.  Her cycles are regular.  Her last menstrual period was normal.   Since her LMP, she has experienced  nausea, loss of appetite, and constipation).   She denies abdominal pain, headache, vaginal discharge, dysuria, and vaginal bleeding.    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      She has a history of  prior  section    Since her last LMP she denies use of alcohol, tobacco and street drugs.    Past medical, surgical, social and family history were reviewed and updated in [facility].      Current Outpatient Medications   Medication    doxylamine (UNISOM SLEEPTABS) 25 MG TABS tablet    enoxaparin ANTICOAGULANT (LOVENOX) 40 MG/0.4ML syringe    omeprazole (PRILOSEC OTC) 20 MG EC tablet    ondansetron (ZOFRAN ODT) 4 MG ODT tab    pyridOXINE (VITAMIN B6) 25 MG tablet     No current facility-administered medications for this visit.       ROS:   12 point review of systems negative other than symptoms noted below or in the HPI.      OBJECTIVE:     EXAM:  /73 (BP Location: Left arm, Patient Position: Sitting, Cuff Size: Adult Regular)   Pulse 80   Wt 76.8 kg (169 lb 4.8 oz)   LMP 2023   SpO2 100%   BMI 28.17 kg/m   Body mass index is 28.17 kg/m .    GENERAL: healthy, alert and no distress  EYES: Eyes grossly normal to inspection, PERRL and conjunctivae and sclerae normal  HENT: ear canals and TM's normal, nose and mouth without ulcers or lesions  NECK: no adenopathy, no  asymmetry, masses, or scars and thyroid normal to palpation  RESP:RRR  BREAST: normal without masses, tenderness or nipple discharge and no palpable axillary masses or adenopathy  CV: regular rate and rhythm no peripheral edema   ABDOMEN: soft, nontender, no hepatosplenomegaly, no masses and bowel sounds normal  MS: no gross musculoskeletal defects noted, no edema  SKIN: no suspicious lesions or rashes  NEURO: Normal strength and tone, mentation intact and speech normal  PSYCH: mentation appears normal, affect normal/bright    ASSESSMENT/PLAN:       ICD-10-CM    1. Encounter for supervision of high risk multigravida of advanced maternal age, antepartum  O09.529 ABO/Rh type and screen     HIV Antigen Antibody Combo     Rubella Antibody IgG     Treponema Abs w Reflex to RPR and Titer     Urine Culture Aerobic Bacterial     **Hemoglobin A1c FUTURE 3mo          33 year old , 10w6d weeks of pregnancy with COSMO of 2024, by Last Menstrual Period    Discussed as follows:  -MD care for pregnancy  -delivery at [facility] with residents   -saw hematology for hx of PE on lovenox for duration of pregnancy   -mfm referral for AMA, hx PE  -discussed needing to complete tolac consent with MD   -start stool softener for constipation  -pap hx CIN3 due 2024  -recc tdap, flu and covid this pregnancy  -will complete NOB labs today  -anatomy scan  18-20 wks GA  -early US cw LMP cosmo remain 24    Counseling given:   - Follow up in 4-6 weeks for return OB visit.  - Recommended weight gain for pregnancy: 15-25 lbs.         PLAN/PATIENT INSTRUCTIONS:         KENROY Valadez CNP

## 2023-12-15 ENCOUNTER — TELEPHONE (OUTPATIENT)
Dept: OBGYN | Facility: CLINIC | Age: 33
End: 2023-12-15
Payer: COMMERCIAL

## 2023-12-15 DIAGNOSIS — O09.521 MULTIGRAVIDA OF ADVANCED MATERNAL AGE IN FIRST TRIMESTER: ICD-10-CM

## 2023-12-15 DIAGNOSIS — Z86.711 HISTORY OF PULMONARY EMBOLUS (PE): Primary | ICD-10-CM

## 2023-12-15 DIAGNOSIS — O09.899 HISTORY OF PRETERM DELIVERY, CURRENTLY PREGNANT: ICD-10-CM

## 2023-12-15 DIAGNOSIS — O09.529 HIGH-RISK PREGNANCY, ELDERLY MULTIGRAVIDA, UNSPECIFIED TRIMESTER: Primary | ICD-10-CM

## 2023-12-15 LAB
BACTERIA UR CULT: NORMAL
HIV 1+2 AB+HIV1 P24 AG SERPL QL IA: NONREACTIVE

## 2023-12-15 NOTE — TELEPHONE ENCOUNTER
[facility] Call Center    Phone Message    May a detailed message be left on voicemail: yes     Reason for Call: Order(s): Other:   Reason for requested: Genetic Testing   Date needed: N/A  Provider name: Yelena JASMINE  Pt states she had appt yesterday but forgot to ask about genetic testing. Pt would like to have an order for Genetic testing to be done. Please call patient with questions or concerns.     Action Taken: Other: OBGYN    Travel Screening: Not Applicable

## 2023-12-21 ENCOUNTER — PRE VISIT (OUTPATIENT)
Dept: MATERNAL FETAL MEDICINE | Facility: CLINIC | Age: 33
End: 2023-12-21
Payer: COMMERCIAL

## 2023-12-26 ENCOUNTER — OFFICE VISIT (OUTPATIENT)
Dept: MATERNAL FETAL MEDICINE | Facility: CLINIC | Age: 33
End: 2023-12-26
Attending: OBSTETRICS & GYNECOLOGY
Payer: COMMERCIAL

## 2023-12-26 ENCOUNTER — MEDICAL CORRESPONDENCE (OUTPATIENT)
Dept: HEALTH INFORMATION MANAGEMENT | Facility: CLINIC | Age: 33
End: 2023-12-26

## 2023-12-26 ENCOUNTER — LAB (OUTPATIENT)
Dept: LAB | Facility: CLINIC | Age: 33
End: 2023-12-26
Attending: OBSTETRICS & GYNECOLOGY
Payer: COMMERCIAL

## 2023-12-26 ENCOUNTER — HOSPITAL ENCOUNTER (OUTPATIENT)
Dept: ULTRASOUND IMAGING | Facility: CLINIC | Age: 33
Discharge: HOME | End: 2023-12-26
Attending: OBSTETRICS & GYNECOLOGY
Payer: COMMERCIAL

## 2023-12-26 DIAGNOSIS — O09.899 HISTORY OF PRETERM DELIVERY, CURRENTLY PREGNANT: ICD-10-CM

## 2023-12-26 DIAGNOSIS — O09.521 MULTIGRAVIDA OF ADVANCED MATERNAL AGE IN FIRST TRIMESTER: ICD-10-CM

## 2023-12-26 DIAGNOSIS — Z86.711 HISTORY OF PULMONARY EMBOLUS (PE): ICD-10-CM

## 2023-12-26 DIAGNOSIS — Z31.438 ENCOUNTER FOR OTHER GENETIC TESTING OF FEMALE FOR PROCREATIVE MANAGEMENT: ICD-10-CM

## 2023-12-26 DIAGNOSIS — O09.521 MULTIGRAVIDA OF ADVANCED MATERNAL AGE IN FIRST TRIMESTER: Primary | ICD-10-CM

## 2023-12-26 LAB
FACTOR 2 INTERPRETATION: NORMAL
LAB DIRECTOR COMMENTS: NORMAL
LAB DIRECTOR DISCLAIMER: NORMAL
LAB DIRECTOR INTERPRETATION: NORMAL
LAB DIRECTOR METHODOLOGY: NORMAL
LAB DIRECTOR RESULTS: NORMAL
LOCATION OF TASK: NORMAL
SPECIMEN DESCRIPTION: NORMAL

## 2023-12-26 PROCEDURE — 85730 THROMBOPLASTIN TIME PARTIAL: CPT

## 2023-12-26 PROCEDURE — 85390 FIBRINOLYSINS SCREEN I&R: CPT | Mod: 26 | Performed by: PATHOLOGY

## 2023-12-26 PROCEDURE — G0452 MOLECULAR PATHOLOGY INTERPR: HCPCS | Mod: 26 | Performed by: STUDENT IN AN ORGANIZED HEALTH CARE EDUCATION/TRAINING PROGRAM

## 2023-12-26 PROCEDURE — 96040 HC GENETIC COUNSELING, EACH 30 MINUTES: CPT

## 2023-12-26 PROCEDURE — 76813 OB US NUCHAL MEAS 1 GEST: CPT

## 2023-12-26 PROCEDURE — 86146 BETA-2 GLYCOPROTEIN ANTIBODY: CPT

## 2023-12-26 PROCEDURE — 36415 COLL VENOUS BLD VENIPUNCTURE: CPT

## 2023-12-26 PROCEDURE — 86147 CARDIOLIPIN ANTIBODY EA IG: CPT

## 2023-12-26 PROCEDURE — 81240 F2 GENE: CPT

## 2023-12-26 PROCEDURE — 76813 OB US NUCHAL MEAS 1 GEST: CPT | Mod: 26 | Performed by: OBSTETRICS & GYNECOLOGY

## 2023-12-26 PROCEDURE — 99205 OFFICE O/P NEW HI 60 MIN: CPT | Mod: 25 | Performed by: OBSTETRICS & GYNECOLOGY

## 2023-12-26 NOTE — PROGRESS NOTES
[facility] Maternal [facility]etal [facility]  Genetic Counseling Consult    Patient:  Chrissy Hernandez YOB: 1990   Date of Service:  23   MRN: [id number]    Chrissy was seen at the [facility] [place] for genetic consultation. The indication for genetic counseling is advanced maternal age. The patient was unaccompanied to this visit.     The session was conducted in English.      IMPRESSION/ PLAN   1. Chrissy has not had genetic screening in this pregnancy but elected to have screening today.     2. During today's MF visit, Chrissy had a blood draw for non-invasive prenatal screening (NIPS) through [facility]. This NIPS screens for trisomy 21, 18, and 13 and the patient opted to screen for sex chromosome aneuploidies, including reported fetal sex. Results are expected in 7-10 days. The patient will be called with results and if they do not answer they requested a detailed message with results on their voicemail, including the predicted fetal sex information.  Patient was informed that results, including fetal sex, will be available in [employer].    3. Chrissy also elected for expanded carrier screening through [facility]. Results are expected in 2-3 weeks. The patient will be called with results and if they do not answer they requested a detailed message with results on their voicemail. Chrissy was informed that results will be available in [employer].    4. Chrissy had a nuchal translucency ultrasound today and an MFM consult. Please see the ultrasound report and the MFM consult note for further details.    5. Further recommendations include a fetal anatomy level II ultrasound with MFM. The upcoming ultrasound has been scheduled for 2024.    PREGNANCY HISTORY   /Parity:     Chrissy's pregnancy history is significant for:   1   at 32w0d  7 term NSVDs  1   1     CURRENT PREGNANCY   Current Age: 33 year old   Age at Delivery: 34 year  "old  COSMO: 7/5/2024, by Last Menstrual Period                                   Gestational Age: 12w4d  This pregnancy is a single gestation.   This pregnancy was conceived spontaneously.    MEDICAL HISTORY   Chrissy has a personal history of pulmonary embolism in 2018. She has had a partial lab workup for this history on 07/14/2018 including negative factor V, negative protein S, negative protein C, and negative antithrombin III. To complete this workup, factor 2 prothrombin, cardiolipin antibodies, beta 2 glycoprotein antibodies, and lupus anticoagulation panels were ordered today by Dr. Walter Almodovar. For more details about how Chrissy's personal medical history will influence her pregnancy management, please refer to Dr. Almodovar's consult note.       FAMILY HISTORY   A three-generation family history was obtained today and is scanned under the \"Media\" tab in Epic. The family history was reported by Chrissy.    The following significant findings were reported today:   Chrissy has one paternal half-sister who has had 5 miscarriages.  We discussed how recurrent pregnancy loss can sometimes be due to an underlying cause. Common causes of recurrent pregnancy loss include maternal factors, endocrine disorders, immune disorders, and chromosomal and single gene disorders. Without further information regarding an underlying cause for these relative's recurrent pregnancy loss, an accurate risk assessment cannot be provided and the possibility of a familial chromosomal rearrangement cannot be ruled out.   Chrissy's partner has a maternal half-sister who has congenital deafness. Chrissy shared her partner's mother also has some cousins or second cousins who are deaf as well. These relatives do not have any health health concerns beyond hearing loss.  There can be many causes of hearing loss including teratogens and single gene conditions. We discussed that, given this family history, it is possible that these relatives have a " nonsyndromic genetic form of deafness. We discussed that some of the causative genes are screened for on expanded carrier screening. However, without knowing more details about the affected relatives, a genetic cause of deafness cannot be ruled out.    Otherwise, the reported family history is unremarkable for multiple miscarriages, stillbirths, birth defects, intellectual disabilities, autism spectrum disorder, developmental delays, cancer diagnosed under 50, known genetic conditions, and consanguinity.       RISK ASSESSMENT FOR CHROMOSOME CONDITIONS   We explained that the risk for fetal chromosome abnormalities increases with maternal age. We discussed specific features of common chromosome abnormalities, including Down syndrome, trisomy 13, trisomy 18, and sex chromosome trisomies.    At age 33 in the first trimester, the risk to have a baby with Down syndrome is 1 in 230.   At age 33 in the first trimester, the risk to have a baby with any chromosome abnormality is 1 in  114.     We also discussed that current ACMG guidelines recommend that screening for 22q11.2 deletion syndrome be offered to all pregnant patients. 22q11.2 deletion syndrome has an estimated prevalence of 1 in 990 to 1 in 2148 (0.05-0.1%). Risk is not thought to increase with maternal age. Clinical features are variable but include congenital heart defects, cleft palate, developmental delays, immune system deficiencies, and hearing loss. Approximately 90% of cases are de max (a sporadic new change in a pregnancy). Cell-free DNA screening for 22q11.2 deletion syndrome is available (expanded NIPS through [facility]). We discussed the limitations of cell-free DNA screening in detecting microdeletions and the possiblity of false positives and false negatives. The data on this condition is more limited, so there is not a positive or negative predictive value available for results interpretation.     [facility] has not had genetic screening in  this pregnancy but elected to have screening today.      GENETIC TESTING OPTIONS FOR CHROMOSOMAL CONDITIONS   Genetic testing during a pregnancy includes screening and diagnostic procedures.      Screening tests are non-invasive which means no risk to the pregnancy and includes ultrasounds and blood work. The benefits and limitations of screening were reviewed. Screening tests provide a risk assessment (chance) specific to the pregnancy for certain fetal chromosome abnormalities but cannot definitively diagnose or exclude a fetal chromosome abnormality. Follow-up genetic counseling and consideration of diagnostic testing is recommended with any abnormal screening result. Diagnostic testing during a pregnancy is more certain and can test for more conditions. However, the tests do have a risk of miscarriage that requires careful consideration. These tests can detect fetal chromosome abnormalities with greater than 99% certainty. Results can be compromised by maternal cell contamination or mosaicism and are limited by the resolution of current genetic testing technology.     There is no screening or diagnostic test that detects all forms of birth defects or intellectual disability.     We discussed the following screening options:   Non-invasive prenatal screening (NIPS)  Also called cell-free DNA screening because it detects chromosomes from the placenta in the pregnant person's blood  Can be done any time after 10 weeks gestation  Screens for trisomy 21, trisomy 18, trisomy 13, and sex chromosome aneuploidies  Expanded NIPS also allows for reflex to include other microdeletion conditions and rare autosomal trisomies if an indication would arise later in the pregnancy. Chrissy declined screening for 22q11.2 deletion syndrome.   Cannot screen for open neural tube defects, maternal serum AFP after 15 weeks is recommended    We discussed the following ultrasound options:  Nuchal translucency (NT) ultrasound  Ultrasound  between [id number] that includes nuchal translucency measurement and nasal bone assessments  Nuchal translucency refers to the space at the back of the neck where fluid builds up. All babies at this stage have fluid and there is only concern if there is too much fluid  Nasal bone refers to the small bone in the nose. There is concern for conditions like Down syndrome if the bone cannot be seen at all  This ultrasound can be done as part of first trimester screening, at the same time as another screen (NIPS), at the same time as a CVS, or if the patients does not want genetic screening.  Markers on ultrasound detects about 70% of pregnancies with aneuploidy  Abnormalities on NT ultrasound can also increase the risk for a birth defect, like a heart defect  Comprehensive level II ultrasound (Fetal Anatomy Ultrasound)  Ultrasound done between 18-20 weeks gestation  Screens for major birth defects and markers for aneuploidy (like trisomy 21 and trisomy 18)  Includes looking at the fetus/baby's growth, heart, organs (stomach, kidneys), placenta, and amniotic fluid    We discussed the following diagnostic options:   Chorionic villus sampling (CVS)  Invasive diagnostic procedure done between 10w0d and 13w6d  The procedure collects a small sample from the placenta for the purpose of chromosomal testing and/or other genetic testing  Diagnostic result; more than 99% sensitivity for fetal chromosome abnormalities  Cannot screen for open neural tube defects, maternal serum AFP after 15 weeks is recommended  Amniocentesis  Invasive diagnostic procedure done after 15 weeks gestation  The procedure collects a small sample of amniotic fluid for the purpose of chromosomal testing and/or other genetic testing  Diagnostic result; more than 99% sensitivity for fetal chromosome abnormalities  Testing for AFP in the amniotic fluid can test for open neural tube defects    CARRIER SCREENING   Expanded carrier screening is available to  screen for autosomal recessive conditions and X-linked conditions in a large list of genes. Autosomal recessive conditions happen when a mutation has been inherited from the egg and sperm and include conditions like cystic fibrosis, thalassemia, hearing loss, spinal muscular atrophy, and more. X-linked conditions happen when a mutation has been inherited from the egg and include conditions like fragile X syndrome.  screening was also reviewed. About [place] Screening    The patient elected to pursue carrier screening today. We discussed the following:   Carrier screening does not test the pregnancy but gives a risk assessment for the pregnancy and future pregnancies to have the condition  If both partners are carriers of the same condition, there is a 1 in 4 (25%) chance for any pregnancies they have together to have the condition   There are different size panels or list of conditions for carrier screening. The patient elected for [facility]'s comprehensive panel of 558 genes including fragile X syndrome  Some conditions cause health problems for carriers  We discussed the Genetic Information Nondiscrimination Act (DEANNA) and important gaps in the protections it affords  Carrier screening does not test for all genetic and health conditions or risk factors  There are limitations to current technology and results may be updated at a later date  The results typically take 2-3 weeks. They will be available in EPIC and routed to the referring OB provider. The patient can view them in [employer] and the lab's patient portal.  The patient's partner will be recommended to have carrier screening if the patient is found to be a carrier for an autosomal recessive condition.  If an individual is a carrier, family members could be as well. Sharing this information with relatives may be beneficial.         It was a pleasure to be involved with Sowdo s care. Face-to-face time of the meeting was 45 minutes.    Ana Lombardo MS,  [facility]  Maternal Fetal Medicine  Office: [phone]  [facility]: [phone]   Fax: [phone]  [facility]

## 2023-12-26 NOTE — PROGRESS NOTES
"Please see \"Imaging\" tab under \"Chart Review\" for details of today's visit, which is summarized below:      Impression  =========    1) Wilson intrauterine pregnancy at 12w 4d gestational age.  2) The nuchal translucency measurement is within the normal range.  3) The nasal bone was visualized.    Recommendation  ==============    We discussed the findings on today's ultrasound with the patient.    Your patient had cell-free DNA screening drawn today. The results of the  screen will be forwarded to you as soon as they become available. Because cell-free DNA screening does not screen for open neural tube defects, the  patient should be offered MSAFP screening at 15 to 20 weeks gestation.    We also reviewed Chrissy's history of provoked PE in 2018. Chrissy states that the PE occured during a long airplane trip to [facility]. Her symptoms started as bilateral lower extremity pain and swelling, then progressing to pleurisy, palpitations and dyspnea prompting CT [place], at which time she was diagnosed with a small right lower lobe pulmonary embolism. She was treated for 3 months with Xarelto and follow up imaging was negative for residual thrombus. She has had 3 pregnancies since this PE and in each, she has been placed on prophylactic Lovenox for 6 weeks post-partum. She is planning on traveling to [facility] in January or February and has been prescribed prophylactic Lovenox during her plane trip. Her thrombophilia evaluation was negative, although I do ot see testing for acquired thrombophilias. There is no family history of venous thromboembolism. We have ordered testing for acquired thrombophilia today. Chrissy was not pregnant or on estrogen when the VTE occured. As this was a provoked VTE not related to pregnancy or estrogen, agree with plan for clinical surveillance without anticoagulation antepartum and post-partum anticoagulation if additional risk factors present. Chrissy has had risk factors such as " PPH and CS in the past and she feels comfortable with the set plan for post-partum anticoagulation for 6 weeks. This can be initiated no sooner than four hours after removal of an epidural and 12 hours after the regional anesthesia placement. The exact timing should be discussed with the anesthesiologist performing the procedure. From an obstetrics stand point, assuming no significant bleeding, anticoagulation can usually be resumed 12 hours after a  birth or six hours after a vaginal birth. Pneumatic compression devices are recommended. Given Chrissy's history of PTB in her first pregnancy. This baby was born in [place] and no records available. Chrissy states she had contractions leading to the baby's birth at ~ 32 weeks. The baby weighed 4 pounds and she states was healthy at birth. She then had subsequent term deliveries; therefore, we will plan for a TV US to evaluate cervical length at the time of the comprehensive US and as long as this returns within normal limits, then will plan to discontinue CL surveillance at that time. Of note, if Chrissy's acquired thrombophilia testing returns positive, we will plan to contact her to discuss next steps during pregnancy.    Comprehensive US with TV US will be scheduled here at 18 to 20 weeks gestation.    Return to primary provider for continued prenatal care.    Thank-you for the opportunity to participate in the care of this patient. If you have questions regarding today's evaluation or if we can be of further service, please contact the Maternal-Fetal Medicine Center.    **Fetal anomalies may be present but not detected**    I spent a total of 60 minutes on the date of this encounter including preparing to see the patient (reviewing medical records/tests), in direct face-to-face contact with the patient during her visit with the majority spent counseling and discussing the plan of care and documenting the visit in the electronic medical record. Please see note for  details.    Walter Almodovar

## 2023-12-27 LAB
B2 GLYCOPROT1 IGG SERPL IA-ACNC: 1.7 U/ML
B2 GLYCOPROT1 IGM SERPL IA-ACNC: 4.9 U/ML
CARDIOLIPIN IGG SER IA-ACNC: <2 GPL-U/ML
CARDIOLIPIN IGG SER IA-ACNC: NEGATIVE
CARDIOLIPIN IGM SER IA-ACNC: 7.6 MPL-U/ML
CARDIOLIPIN IGM SER IA-ACNC: NEGATIVE
DRVVT SCREEN RATIO: 0.82
INR PPP: 1.01 (ref 0.85–1.15)
LA PPP-IMP: NEGATIVE
LUPUS INTERPRETATION: NORMAL
PTT RATIO: 1.06
THROMBIN TIME: 16.9 SECONDS (ref 13–19)

## 2024-01-02 ENCOUNTER — TELEPHONE (OUTPATIENT)
Dept: MATERNAL FETAL MEDICINE | Facility: CLINIC | Age: 34
End: 2024-01-02
Payer: COMMERCIAL

## 2024-01-02 LAB — SCANNED LAB RESULT: NORMAL

## 2024-01-02 NOTE — CONFIDENTIAL NOTE
January 2, 2024    I spoke with Chrissy regarding her non-invasive prenatal screen (NIPS) results.     Results indicate NO ANEUPLOIDY DETECTED for chromosomes 21, 18, 13, or sex chromosomes (XX - FEMALE).    This puts her current pregnancy at low risk for Down syndrome, trisomy 18, trisomy 13 and sex chromosome abnormalities. This test is reported to have the following sensitivities: Down syndrome: 99.99%, trisomy 18: 99.99%, and trisomy 13: 99.99%. Although these results are reassuring, this does not replace a standard chromosome analysis from a chorionic villus sampling or amniocentesis.     Level II ultrasound is recommended, given AMA, and has been scheduled for 02/02/2024.    MSAFP is the appropriate second trimester screening test for open neural tube defects; the maternal quad screen is not recommended.    Her results will be made available in her Epic chart for her primary OB to review.       Ana Lombardo MS, [facility]  Maternal Fetal Medicine  Office: [phone]  MF: [phone]   Fax: [phone]  [facility]

## 2024-01-05 ENCOUNTER — TELEPHONE (OUTPATIENT)
Dept: MATERNAL FETAL MEDICINE | Facility: CLINIC | Age: 34
End: 2024-01-05
Payer: COMMERCIAL

## 2024-01-05 LAB — SCANNED LAB RESULT: NORMAL

## 2024-01-05 NOTE — CONFIDENTIAL NOTE
Carrier Screening Results Disclosure  [facility] Maternal Fetal Medicine    I left a voicemail for Chrissy today to review her carrier screening results (558 gene panel through [facility]). Her partner has not had carrier screening. Chrissy was not found to harbor pathogenic variants in any genes. This greatly reduces the chances that their current pregnancy or future pregnancies could have any of these conditions.       Other Results of Note   For Chrissy's screening, normal triplet repeats (CGG nucleotides) were observed in the FMR1 gene at lengths of 29 and 36 (normal: <45 CGG repeats; intermediate carrier: 45-54 CGG repeats; premutation carrier:  CGG repeats; full mutation/Fragile X Syndrome: >200 CGG repeats). Given that Chrissy has normal repeat lengths, she is not expected to be a carrier of Fragile X Syndrome.     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  Pseudodeficiency alleles are not known to be associated with disease, including Krabbe.    The presence of a pseudodeficiency allele does not impact the risk to be a carrier. Carrier testing for reproductive partners is not indicated based on these results. People with pseudodeficiency alleles may have false positive results on biochemical tests such as  screening. Pseudodeficiency alleles are not known to cause disease, even when homozygous or in combination with another disease-causing variant (compound heterozygous). Sharing these results with a provider should Chrissy have a child screen positive on  screening could be helpful.        Ana Lombardo, MS, [facility]  Maternal Fetal Medicine  Office: [phone]  [facility]: [phone]   Fax: [phone]  [facility]

## 2024-01-15 ENCOUNTER — TELEPHONE (OUTPATIENT)
Dept: MATERNAL FETAL MEDICINE | Facility: CLINIC | Age: 34
End: 2024-01-15
Payer: COMMERCIAL

## 2024-01-16 NOTE — TELEPHONE ENCOUNTER
I called Chrissy to review her lab results from her last visit with [facility] and after several attempts, I was not able to connect with Chrissy and left a voice mail message indicated that her lab results returned within normal limits.  I have asked her to call me with any questions and we can also review this information at her next US.    Component      Latest Ref Rng 12/26/2023  2:38 PM   METHODOLOGY This result contains rich text formatting which cannot be displayed here.    RESULTS This result contains rich text formatting which cannot be displayed here.    INTERPRETATION This result contains rich text formatting which cannot be displayed here.    COMMENTS This result contains rich text formatting which cannot be displayed here.    DISCLAIMER This result contains rich text formatting which cannot be displayed here.    FACTOR 2 INTERPRETATION This result contains rich text formatting which cannot be displayed here.    Signout Location if Remote Report signed out at:      Specimen Description This result contains rich text formatting which cannot be displayed here.    INR      0.85 - 1.15  1.01    Thrombin Time      13.0 - 19.0 Seconds 16.9    PTT Ratio      <1.21  1.06    DRVVT Screen Ratio      <1.08  0.82    Lupus Result      Negative  Negative    Lupus Interpretation The INR is normal.     Cardiolipin Keyla IgG Instrument Value      <10.0 GPL-U/mL <2.0    Cardiolipin IgG Keyla      Negative  Negative    Cardiolipin Keyla IgM Instrument Value      <10.0 MPL-U/mL 7.6    Cardiolipin IgM Keyla      Negative  Negative    Beta 2 Glycoprotein 1 Antibody IgG      <7.0 U/mL 1.7    Beta 2 Glycoprotein 1 Antibody IgM      <7.0 U/mL 4.9        Walter Almodovar MD

## 2024-02-15 DIAGNOSIS — O09.529 HIGH-RISK PREGNANCY, ELDERLY MULTIGRAVIDA, UNSPECIFIED TRIMESTER: Primary | ICD-10-CM

## 2024-03-13 ENCOUNTER — TELEPHONE (OUTPATIENT)
Dept: OBGYN | Facility: CLINIC | Age: 34
End: 2024-03-13
Payer: COMMERCIAL

## 2024-03-13 NOTE — TELEPHONE ENCOUNTER
Attempted 3 times to get fetal survey scheduled. Patient is now >23 weeks. FYI.       Thank you!      Fela Casas MA on 3/13/2024 at 11:35 AM

## 2024-05-13 ENCOUNTER — PRENATAL OFFICE VISIT (OUTPATIENT)
Dept: OBGYN | Facility: CLINIC | Age: 34
End: 2024-05-13
Payer: COMMERCIAL

## 2024-05-13 VITALS
HEART RATE: 85 BPM | BODY MASS INDEX: 31.45 KG/M2 | DIASTOLIC BLOOD PRESSURE: 67 MMHG | OXYGEN SATURATION: 99 % | SYSTOLIC BLOOD PRESSURE: 115 MMHG | WEIGHT: 189 LBS

## 2024-05-13 DIAGNOSIS — O99.019 ANEMIA IN PREGNANCY: Primary | ICD-10-CM

## 2024-05-13 DIAGNOSIS — Z34.83 ENCOUNTER FOR SUPERVISION OF OTHER NORMAL PREGNANCY IN THIRD TRIMESTER: Primary | ICD-10-CM

## 2024-05-13 LAB
ERYTHROCYTE [DISTWIDTH] IN BLOOD BY AUTOMATED COUNT: 18.7 % (ref 10–15)
GLUCOSE 1H P 50 G GLC PO SERPL-MCNC: 137 MG/DL (ref 70–129)
HCT VFR BLD AUTO: 30.4 % (ref 35–47)
HGB BLD-MCNC: 9 G/DL (ref 11.7–15.7)
HGB BLD-MCNC: 9 G/DL (ref 11.7–15.7)
HOLD SPECIMEN: NORMAL
MCH RBC QN AUTO: 22.7 PG (ref 26.5–33)
MCHC RBC AUTO-ENTMCNC: 29.6 G/DL (ref 31.5–36.5)
MCV RBC AUTO: 77 FL (ref 78–100)
PLATELET # BLD AUTO: 246 10E3/UL (ref 150–450)
RBC # BLD AUTO: 3.97 10E6/UL (ref 3.8–5.2)
T PALLIDUM AB SER QL: NONREACTIVE
WBC # BLD AUTO: 11.4 10E3/UL (ref 4–11)

## 2024-05-13 PROCEDURE — 85027 COMPLETE CBC AUTOMATED: CPT | Performed by: OBSTETRICS & GYNECOLOGY

## 2024-05-13 PROCEDURE — 90715 TDAP VACCINE 7 YRS/> IM: CPT | Performed by: OBSTETRICS & GYNECOLOGY

## 2024-05-13 PROCEDURE — 83540 ASSAY OF IRON: CPT | Performed by: OBSTETRICS & GYNECOLOGY

## 2024-05-13 PROCEDURE — 82950 GLUCOSE TEST: CPT | Performed by: OBSTETRICS & GYNECOLOGY

## 2024-05-13 PROCEDURE — 36415 COLL VENOUS BLD VENIPUNCTURE: CPT | Performed by: OBSTETRICS & GYNECOLOGY

## 2024-05-13 PROCEDURE — 99207 PR PRENATAL VISIT: CPT | Performed by: OBSTETRICS & GYNECOLOGY

## 2024-05-13 PROCEDURE — 90471 IMMUNIZATION ADMIN: CPT | Performed by: OBSTETRICS & GYNECOLOGY

## 2024-05-13 PROCEDURE — 83550 IRON BINDING TEST: CPT | Performed by: OBSTETRICS & GYNECOLOGY

## 2024-05-13 PROCEDURE — 82728 ASSAY OF FERRITIN: CPT | Performed by: OBSTETRICS & GYNECOLOGY

## 2024-05-13 PROCEDURE — 86780 TREPONEMA PALLIDUM: CPT | Performed by: OBSTETRICS & GYNECOLOGY

## 2024-05-13 ASSESSMENT — PATIENT HEALTH QUESTIONNAIRE - PHQ9: SUM OF ALL RESPONSES TO PHQ QUESTIONS 1-9: 2

## 2024-05-13 NOTE — PROGRESS NOTES
Return OB visit    Subjective:  Patient presents for prenatal care, hasn't been seen since 2023. She was in [place] for several months and reports getting regular prenatal care but does not have records with her today. She denies any concerns with the pregnancy but did have a low lying placenta on her FAS which she reports has resolved.     She reports active fetal movement, no vaginal bleeding or leaking fluid. She denies contractions. She has no concerns today.       Objective:  /67   Pulse 85   Wt 85.7 kg (189 lb)   LMP 2023   SpO2 99%   BMI 31.45 kg/m     See OB flow sheet    Assessment and Plan    Chrissy Hernandez is a 34 year old  at 32w3d here for GAMAL visit, pregnancy complicated by AMA, history of provoked PE and     This visit:  -GCT, hemoglobin and repeat treponema screen repeated   -Tdap given, patient Rh pos so Rhogam not indicated   -Patient has not had FAS [place] in the [place], so MFM referral placed   -She also has a history of 8 SVDs, one LTCS for breech presentation and a successful . We discussed risks and benefits of trial of labor after  section. Operative report reviewed and it was a LTCS with two layer uterine closure.  Risks of  section includes longer length of hospital stay, increased risk of infection and bleeding. Risk of damage to the bowels/bladder and increased risk of placenta accreta and need for  hysterectomy with each subsequent .  Risk of TOLAC includes an approximately 1:200 risk of uterine rupture which could result in permanent fetal neurological damage, fetal death, maternal blood loss requiring blood transfusion and/or hysterectomy. This risk is higher with more than one prior  in the setting of labor induction/augmentation with oxytocin. We also discussed that the best candidates for TOLAC are women with a high liklihood of success for having a vaginal delivery as the risk of complications increased if a   section is done intrapartum vs prior to the onset of labor. All questions were answered and at this time, the patient would like to proceed with a TOLAC and consent was signed today.   -We also discussed birth control plans as she had previously requested a tubal ligation but she reports that she sought S[employer]ism  and this was not recommended so she would like to have an IUD placed instead.     Next visit:  -Routine PNC     RTC in 2 weeks or sooner CIRA Kaur MD

## 2024-05-14 ENCOUNTER — TRANSCRIBE ORDERS (OUTPATIENT)
Dept: MATERNAL FETAL MEDICINE | Facility: CLINIC | Age: 34
End: 2024-05-14
Payer: COMMERCIAL

## 2024-05-14 DIAGNOSIS — O26.90 PREGNANCY RELATED CONDITION, ANTEPARTUM: Primary | ICD-10-CM

## 2024-05-14 DIAGNOSIS — O99.013 ANEMIA IN PREGNANCY, THIRD TRIMESTER: Primary | ICD-10-CM

## 2024-05-14 DIAGNOSIS — R73.09 HIGH GLUCOSE LEVEL: ICD-10-CM

## 2024-05-14 DIAGNOSIS — R73.03 PRE-DIABETES: Primary | ICD-10-CM

## 2024-05-14 LAB
FERRITIN SERPL-MCNC: 14 NG/ML (ref 6–175)
IRON BINDING CAPACITY (ROCHE): 432 UG/DL (ref 240–430)
IRON SATN MFR SERPL: 4 % (ref 15–46)
IRON SERPL-MCNC: 19 UG/DL (ref 37–145)

## 2024-05-14 RX ORDER — HEPARIN SODIUM,PORCINE 10 UNIT/ML
5-20 VIAL (ML) INTRAVENOUS DAILY PRN
Status: CANCELLED | OUTPATIENT
Start: 2024-05-14

## 2024-05-14 RX ORDER — EPINEPHRINE 1 MG/ML
0.3 INJECTION, SOLUTION, CONCENTRATE INTRAVENOUS EVERY 5 MIN PRN
Status: CANCELLED | OUTPATIENT
Start: 2024-05-14

## 2024-05-14 RX ORDER — HEPARIN SODIUM (PORCINE) LOCK FLUSH IV SOLN 100 UNIT/ML 100 UNIT/ML
5 SOLUTION INTRAVENOUS
Status: CANCELLED | OUTPATIENT
Start: 2024-05-14

## 2024-05-14 RX ORDER — DIPHENHYDRAMINE HYDROCHLORIDE 50 MG/ML
50 INJECTION INTRAMUSCULAR; INTRAVENOUS
Status: CANCELLED
Start: 2024-05-14

## 2024-05-14 RX ORDER — ALBUTEROL SULFATE 90 UG/1
1-2 AEROSOL, METERED RESPIRATORY (INHALATION)
Status: CANCELLED
Start: 2024-05-14

## 2024-05-14 RX ORDER — METHYLPREDNISOLONE SODIUM SUCCINATE 125 MG/2ML
125 INJECTION, POWDER, LYOPHILIZED, FOR SOLUTION INTRAMUSCULAR; INTRAVENOUS
Status: CANCELLED
Start: 2024-05-14

## 2024-05-14 RX ORDER — ALBUTEROL SULFATE 0.83 MG/ML
2.5 SOLUTION RESPIRATORY (INHALATION)
Status: CANCELLED | OUTPATIENT
Start: 2024-05-14

## 2024-05-16 ENCOUNTER — LAB (OUTPATIENT)
Dept: LAB | Facility: CLINIC | Age: 34
End: 2024-05-16
Payer: COMMERCIAL

## 2024-05-16 DIAGNOSIS — R73.09 HIGH GLUCOSE LEVEL: ICD-10-CM

## 2024-05-16 PROCEDURE — 82952 GTT-ADDED SAMPLES: CPT

## 2024-05-16 PROCEDURE — 36415 COLL VENOUS BLD VENIPUNCTURE: CPT

## 2024-05-16 PROCEDURE — 82951 GLUCOSE TOLERANCE TEST (GTT): CPT

## 2024-05-17 ENCOUNTER — HOSPITAL ENCOUNTER (OUTPATIENT)
Dept: ULTRASOUND IMAGING | Facility: CLINIC | Age: 34
Discharge: HOME | End: 2024-05-17
Attending: OBSTETRICS & GYNECOLOGY
Payer: COMMERCIAL

## 2024-05-17 ENCOUNTER — OFFICE VISIT (OUTPATIENT)
Dept: MATERNAL FETAL MEDICINE | Facility: CLINIC | Age: 34
End: 2024-05-17
Attending: OBSTETRICS & GYNECOLOGY
Payer: COMMERCIAL

## 2024-05-17 DIAGNOSIS — Z36.2 ENCOUNTER FOR FOLLOW-UP ULTRASOUND OF FETAL ANATOMY: ICD-10-CM

## 2024-05-17 DIAGNOSIS — O26.90 PREGNANCY RELATED CONDITION, ANTEPARTUM: ICD-10-CM

## 2024-05-17 DIAGNOSIS — O09.523 MULTIGRAVIDA OF ADVANCED MATERNAL AGE IN THIRD TRIMESTER: Primary | ICD-10-CM

## 2024-05-17 LAB
GESTATIONAL GTT 1 HR POST DOSE: 166 MG/DL (ref 60–179)
GESTATIONAL GTT 2 HR POST DOSE: 152 MG/DL (ref 60–154)
GESTATIONAL GTT 3 HR POST DOSE: 124 MG/DL (ref 60–139)
GLUCOSE P FAST SERPL-MCNC: 85 MG/DL (ref 60–94)

## 2024-05-17 PROCEDURE — 76811 OB US DETAILED SNGL FETUS: CPT

## 2024-05-17 PROCEDURE — 76811 OB US DETAILED SNGL FETUS: CPT | Mod: 26 | Performed by: OBSTETRICS & GYNECOLOGY

## 2024-05-17 NOTE — NURSING NOTE
Patient presents to [facility] for RL2 at 33w0d due to AMA. Positive fetal movement. Denies LOF, vaginal bleeding or cramping/contractions. SBAR given to YESICA MD, see their note in Epic.

## 2024-05-17 NOTE — PROGRESS NOTES
"Please see \"Imaging\" tab under \"Chart Review\" for details of today's US at the [facility].    Nasir Amanda MD  Maternal-Fetal Medicine    "

## 2024-05-22 ENCOUNTER — INFUSION THERAPY VISIT (OUTPATIENT)
Dept: INFUSION THERAPY | Facility: CLINIC | Age: 34
End: 2024-05-22
Attending: OBSTETRICS & GYNECOLOGY
Payer: COMMERCIAL

## 2024-05-22 VITALS
TEMPERATURE: 98.5 F | DIASTOLIC BLOOD PRESSURE: 83 MMHG | OXYGEN SATURATION: 99 % | SYSTOLIC BLOOD PRESSURE: 132 MMHG | HEART RATE: 95 BPM

## 2024-05-22 DIAGNOSIS — O99.013 ANEMIA IN PREGNANCY, THIRD TRIMESTER: Primary | ICD-10-CM

## 2024-05-22 PROCEDURE — 96365 THER/PROPH/DIAG IV INF INIT: CPT

## 2024-05-22 PROCEDURE — 258N000003 HC RX IP 258 OP 636: Performed by: OBSTETRICS & GYNECOLOGY

## 2024-05-22 PROCEDURE — 250N000011 HC RX IP 250 OP 636: Performed by: OBSTETRICS & GYNECOLOGY

## 2024-05-22 RX ORDER — DIPHENHYDRAMINE HYDROCHLORIDE 50 MG/ML
50 INJECTION INTRAMUSCULAR; INTRAVENOUS
Status: CANCELLED
Start: 2024-05-24

## 2024-05-22 RX ORDER — HEPARIN SODIUM,PORCINE 10 UNIT/ML
5-20 VIAL (ML) INTRAVENOUS DAILY PRN
Status: CANCELLED | OUTPATIENT
Start: 2024-05-24

## 2024-05-22 RX ORDER — ALBUTEROL SULFATE 0.83 MG/ML
2.5 SOLUTION RESPIRATORY (INHALATION)
Status: CANCELLED | OUTPATIENT
Start: 2024-05-24

## 2024-05-22 RX ORDER — HEPARIN SODIUM (PORCINE) LOCK FLUSH IV SOLN 100 UNIT/ML 100 UNIT/ML
5 SOLUTION INTRAVENOUS
Status: CANCELLED | OUTPATIENT
Start: 2024-05-24

## 2024-05-22 RX ORDER — EPINEPHRINE 1 MG/ML
0.3 INJECTION, SOLUTION, CONCENTRATE INTRAVENOUS EVERY 5 MIN PRN
Status: CANCELLED | OUTPATIENT
Start: 2024-05-24

## 2024-05-22 RX ORDER — METHYLPREDNISOLONE SODIUM SUCCINATE 125 MG/2ML
125 INJECTION, POWDER, LYOPHILIZED, FOR SOLUTION INTRAMUSCULAR; INTRAVENOUS
Status: CANCELLED
Start: 2024-05-24

## 2024-05-22 RX ORDER — ALBUTEROL SULFATE 90 UG/1
1-2 AEROSOL, METERED RESPIRATORY (INHALATION)
Status: CANCELLED
Start: 2024-05-24

## 2024-05-22 RX ADMIN — IRON SUCROSE 300 MG: 20 INJECTION, SOLUTION INTRAVENOUS at 12:23

## 2024-05-25 ENCOUNTER — APPOINTMENT (OUTPATIENT)
Dept: CT IMAGING | Facility: CLINIC | Age: 34
End: 2024-05-25
Attending: STUDENT IN AN ORGANIZED HEALTH CARE EDUCATION/TRAINING PROGRAM
Payer: COMMERCIAL

## 2024-05-25 ENCOUNTER — HOSPITAL ENCOUNTER (EMERGENCY)
Facility: CLINIC | Age: 34
Discharge: HOME | End: 2024-05-26
Attending: STUDENT IN AN ORGANIZED HEALTH CARE EDUCATION/TRAINING PROGRAM | Admitting: STUDENT IN AN ORGANIZED HEALTH CARE EDUCATION/TRAINING PROGRAM
Payer: COMMERCIAL

## 2024-05-25 DIAGNOSIS — R10.13 EPIGASTRIC PAIN: ICD-10-CM

## 2024-05-25 DIAGNOSIS — R07.9 CHEST PAIN, UNSPECIFIED TYPE: ICD-10-CM

## 2024-05-25 LAB
ALBUMIN SERPL BCG-MCNC: 3.2 G/DL (ref 3.5–5.2)
ALP SERPL-CCNC: 125 U/L (ref 40–150)
ALT SERPL W P-5'-P-CCNC: 8 U/L (ref 0–50)
ANION GAP SERPL CALCULATED.3IONS-SCNC: 10 MMOL/L (ref 7–15)
AST SERPL W P-5'-P-CCNC: 21 U/L (ref 0–45)
BASOPHILS # BLD AUTO: 0.1 10E3/UL (ref 0–0.2)
BASOPHILS NFR BLD AUTO: 0 %
BILIRUB DIRECT SERPL-MCNC: <0.2 MG/DL (ref 0–0.3)
BILIRUB SERPL-MCNC: 0.3 MG/DL
BUN SERPL-MCNC: 4.2 MG/DL (ref 6–20)
CALCIUM SERPL-MCNC: 9 MG/DL (ref 8.6–10)
CHLORIDE SERPL-SCNC: 102 MMOL/L (ref 98–107)
CREAT SERPL-MCNC: 0.42 MG/DL (ref 0.51–0.95)
D DIMER PPP FEU-MCNC: 1.98 UG/ML FEU (ref 0–0.5)
DEPRECATED HCO3 PLAS-SCNC: 23 MMOL/L (ref 22–29)
EGFRCR SERPLBLD CKD-EPI 2021: >90 ML/MIN/1.73M2
EOSINOPHIL # BLD AUTO: 0 10E3/UL (ref 0–0.7)
EOSINOPHIL NFR BLD AUTO: 0 %
ERYTHROCYTE [DISTWIDTH] IN BLOOD BY AUTOMATED COUNT: 19 % (ref 10–15)
GLUCOSE SERPL-MCNC: 104 MG/DL (ref 70–99)
HCT VFR BLD AUTO: 30.3 % (ref 35–47)
HGB BLD-MCNC: 9 G/DL (ref 11.7–15.7)
HOLD SPECIMEN: NORMAL
IMM GRANULOCYTES # BLD: 0.5 10E3/UL
IMM GRANULOCYTES NFR BLD: 4 %
LIPASE SERPL-CCNC: 27 U/L (ref 13–60)
LYMPHOCYTES # BLD AUTO: 1.3 10E3/UL (ref 0.8–5.3)
LYMPHOCYTES NFR BLD AUTO: 10 %
MCH RBC QN AUTO: 23.2 PG (ref 26.5–33)
MCHC RBC AUTO-ENTMCNC: 29.7 G/DL (ref 31.5–36.5)
MCV RBC AUTO: 78 FL (ref 78–100)
MONOCYTES # BLD AUTO: 1.1 10E3/UL (ref 0–1.3)
MONOCYTES NFR BLD AUTO: 9 %
NEUTROPHILS # BLD AUTO: 9.4 10E3/UL (ref 1.6–8.3)
NEUTROPHILS NFR BLD AUTO: 77 %
NRBC # BLD AUTO: 0.2 10E3/UL
NRBC BLD AUTO-RTO: 1 /100
PLATELET # BLD AUTO: 221 10E3/UL (ref 150–450)
POTASSIUM SERPL-SCNC: 3.9 MMOL/L (ref 3.4–5.3)
PROT SERPL-MCNC: 6.4 G/DL (ref 6.4–8.3)
RBC # BLD AUTO: 3.88 10E6/UL (ref 3.8–5.2)
SODIUM SERPL-SCNC: 135 MMOL/L (ref 135–145)
TROPONIN T SERPL HS-MCNC: <6 NG/L
WBC # BLD AUTO: 12.3 10E3/UL (ref 4–11)

## 2024-05-25 PROCEDURE — 99285 EMERGENCY DEPT VISIT HI MDM: CPT | Mod: 25 | Performed by: STUDENT IN AN ORGANIZED HEALTH CARE EDUCATION/TRAINING PROGRAM

## 2024-05-25 PROCEDURE — 250N000009 HC RX 250: Performed by: STUDENT IN AN ORGANIZED HEALTH CARE EDUCATION/TRAINING PROGRAM

## 2024-05-25 PROCEDURE — 80053 COMPREHEN METABOLIC PANEL: CPT | Performed by: STUDENT IN AN ORGANIZED HEALTH CARE EDUCATION/TRAINING PROGRAM

## 2024-05-25 PROCEDURE — 36415 COLL VENOUS BLD VENIPUNCTURE: CPT | Performed by: STUDENT IN AN ORGANIZED HEALTH CARE EDUCATION/TRAINING PROGRAM

## 2024-05-25 PROCEDURE — 83690 ASSAY OF LIPASE: CPT | Performed by: STUDENT IN AN ORGANIZED HEALTH CARE EDUCATION/TRAINING PROGRAM

## 2024-05-25 PROCEDURE — 99284 EMERGENCY DEPT VISIT MOD MDM: CPT | Mod: 25 | Performed by: STUDENT IN AN ORGANIZED HEALTH CARE EDUCATION/TRAINING PROGRAM

## 2024-05-25 PROCEDURE — 84484 ASSAY OF TROPONIN QUANT: CPT | Performed by: STUDENT IN AN ORGANIZED HEALTH CARE EDUCATION/TRAINING PROGRAM

## 2024-05-25 PROCEDURE — 250N000013 HC RX MED GY IP 250 OP 250 PS 637: Performed by: STUDENT IN AN ORGANIZED HEALTH CARE EDUCATION/TRAINING PROGRAM

## 2024-05-25 PROCEDURE — 250N000011 HC RX IP 250 OP 636: Performed by: STUDENT IN AN ORGANIZED HEALTH CARE EDUCATION/TRAINING PROGRAM

## 2024-05-25 PROCEDURE — 93010 ELECTROCARDIOGRAM REPORT: CPT | Performed by: STUDENT IN AN ORGANIZED HEALTH CARE EDUCATION/TRAINING PROGRAM

## 2024-05-25 PROCEDURE — 93005 ELECTROCARDIOGRAM TRACING: CPT | Performed by: STUDENT IN AN ORGANIZED HEALTH CARE EDUCATION/TRAINING PROGRAM

## 2024-05-25 PROCEDURE — 71275 CT ANGIOGRAPHY CHEST: CPT

## 2024-05-25 PROCEDURE — 85025 COMPLETE CBC W/AUTO DIFF WBC: CPT | Performed by: STUDENT IN AN ORGANIZED HEALTH CARE EDUCATION/TRAINING PROGRAM

## 2024-05-25 PROCEDURE — 82248 BILIRUBIN DIRECT: CPT | Performed by: STUDENT IN AN ORGANIZED HEALTH CARE EDUCATION/TRAINING PROGRAM

## 2024-05-25 PROCEDURE — 85379 FIBRIN DEGRADATION QUANT: CPT | Performed by: STUDENT IN AN ORGANIZED HEALTH CARE EDUCATION/TRAINING PROGRAM

## 2024-05-25 RX ORDER — MAGNESIUM HYDROXIDE/ALUMINUM HYDROXICE/SIMETHICONE 120; 1200; 1200 MG/30ML; MG/30ML; MG/30ML
15 SUSPENSION ORAL ONCE
Status: COMPLETED | OUTPATIENT
Start: 2024-05-25 | End: 2024-05-25

## 2024-05-25 RX ORDER — LIDOCAINE HYDROCHLORIDE 20 MG/ML
10 SOLUTION OROPHARYNGEAL ONCE
Status: COMPLETED | OUTPATIENT
Start: 2024-05-25 | End: 2024-05-25

## 2024-05-25 RX ORDER — IOPAMIDOL 755 MG/ML
100 INJECTION, SOLUTION INTRAVASCULAR ONCE
Status: COMPLETED | OUTPATIENT
Start: 2024-05-25 | End: 2024-05-25

## 2024-05-25 RX ADMIN — SODIUM CHLORIDE 84 ML: 9 INJECTION, SOLUTION INTRAVENOUS at 23:42

## 2024-05-25 RX ADMIN — ALUMINUM HYDROXIDE, MAGNESIUM HYDROXIDE, AND SIMETHICONE 15 ML: 1200; 120; 1200 SUSPENSION ORAL at 22:23

## 2024-05-25 RX ADMIN — IOPAMIDOL 64 ML: 755 INJECTION, SOLUTION INTRAVENOUS at 23:40

## 2024-05-25 RX ADMIN — LIDOCAINE HYDROCHLORIDE 10 ML: 20 SOLUTION ORAL at 22:23

## 2024-05-25 ASSESSMENT — ACTIVITIES OF DAILY LIVING (ADL)
ADLS_ACUITY_SCORE: 33
ADLS_ACUITY_SCORE: 35

## 2024-05-25 ASSESSMENT — COLUMBIA-SUICIDE SEVERITY RATING SCALE - C-SSRS
2. HAVE YOU ACTUALLY HAD ANY THOUGHTS OF KILLING YOURSELF IN THE PAST MONTH?: NO
1. IN THE PAST MONTH, HAVE YOU WISHED YOU WERE DEAD OR WISHED YOU COULD GO TO SLEEP AND NOT WAKE UP?: NO
6. HAVE YOU EVER DONE ANYTHING, STARTED TO DO ANYTHING, OR PREPARED TO DO ANYTHING TO END YOUR LIFE?: NO

## 2024-05-26 VITALS
HEART RATE: 109 BPM | BODY MASS INDEX: 31.48 KG/M2 | TEMPERATURE: 99.3 F | OXYGEN SATURATION: 100 % | SYSTOLIC BLOOD PRESSURE: 109 MMHG | DIASTOLIC BLOOD PRESSURE: 74 MMHG | RESPIRATION RATE: 19 BRPM | WEIGHT: 189.2 LBS

## 2024-05-26 PROCEDURE — 250N000013 HC RX MED GY IP 250 OP 250 PS 637: Performed by: STUDENT IN AN ORGANIZED HEALTH CARE EDUCATION/TRAINING PROGRAM

## 2024-05-26 RX ORDER — PRENATAL VIT/IRON FUM/FOLIC AC 27MG-0.8MG
1 TABLET ORAL DAILY
Qty: 30 TABLET | Refills: 0 | Status: SHIPPED | OUTPATIENT
Start: 2024-05-26 | End: 2024-06-25

## 2024-05-26 RX ORDER — SUCRALFATE ORAL 1 G/10ML
1 SUSPENSION ORAL ONCE
Qty: 10 ML | Refills: 0 | Status: COMPLETED | OUTPATIENT
Start: 2024-05-26 | End: 2024-05-26

## 2024-05-26 RX ORDER — SUCRALFATE ORAL 1 G/10ML
1 SUSPENSION ORAL 4 TIMES DAILY
Qty: 560 ML | Refills: 0 | Status: SHIPPED | OUTPATIENT
Start: 2024-05-26 | End: 2024-06-09

## 2024-05-26 RX ADMIN — SUCRALFATE 1 G: 1 SUSPENSION ORAL at 00:55

## 2024-05-26 NOTE — ED PROVIDER NOTES
[facility] - [place], [place] EMERGENCY DEPARTMENT ([facility])    24      ED PROVIDER NOTE    History     Chief Complaint   Patient presents with    Chest Pain     Epigastric and upper mid chest intermittent sharp pain for an hour . 35 weeks pregnant.     PING Hernandez is a 34 year old female  at 34w1d gestation with PMH notable for anemia in pregnancy, history of PE who presents with 1 hour of chest pain.  Patient describes the pain is epigastric and mid chest, describes it as sharp and burning.  Feels similar but worse than prior episodes of heartburn.  She is currently taking Prilosec 20 mg daily.  Chart review shows she is supposed be taking this twice daily.  She is not having significant shortness of breath.  No productive cough.  No fevers or chills.  No lower abdominal or pelvic concerns today.  She does endorse a history of provoked PE many years ago, believes her symptoms feel different.    Past Medical History  Past Medical History:   Diagnosis Date    Major depressive disorder with single episode, in full remission (H24) 2012    Formatting of this note might be different from the original. Overview: Postpartum onset, severe, , hospitalized Formatting of this note might be different from the original. Postpartum onset, severe fall , hospitalized Formatting of this note might be different from the original. Overview: Postpartum onset, severe, , hospitalized    Pulmonary embolism (H) 2017    not pregnant travelling    Wrist tendonitis 2008     Past Surgical History:   Procedure Laterality Date    BIOPSY CERVICAL, LOCAL EXCISION, SINGLE/MULTIPLE      C/SECTION, CLASSICAL       omeprazole (PRILOSEC OTC) 20 MG EC tablet  Prenatal Vit-Fe Fumarate-FA (PRENATAL MULTIVITAMIN W/IRON) 27-0.8 MG tablet  sucralfate (CARAFATE) 1 GM/10ML suspension  doxylamine (UNISOM SLEEPTABS) 25 MG TABS tablet  enoxaparin ANTICOAGULANT (LOVENOX) 40 MG/0.4ML syringe  ondansetron (ZOFRAN ODT) 4 MG  ODT tab  pyridOXINE (VITAMIN B6) 25 MG tablet      Allergies   Allergen Reactions    Egg Solids, Whole Itching     Family History  Family History   Problem Relation Age of Onset    Diabetes Mother      Social History   Social History     Tobacco Use    Smoking status: Never     Passive exposure: Never    Smokeless tobacco: Never   Vaping Use    Vaping status: Never Used   Substance Use Topics    Alcohol use: Never    Drug use: Never      Past medical history, past surgical history, medications, allergies, family history, and social history were reviewed with the patient. No additional pertinent items.     A complete review of systems was performed with pertinent positives and negatives noted in the HPI, and all other systems negative.    Physical Exam   BP: 109/74  Pulse: 114  Temp: 99.3  F (37.4  C)  Resp: 18  Weight: 85.8 kg (189 lb 3.2 oz)  SpO2: 100 %  Physical Exam  Vital Signs Reviewed  Gen: Well nourished, well developed, resting comfortably, no acute distress  HEENT: NC/AT, PERRL, EOMI, MMM  Neck: Supple, FROM  CV: Regular tachycardia  Lungs/Chest: Normal Effort, clear to auscultation  Abd: Gravid, soft nontender  MSK/Back: FROM, no visible deformity  Neuro: A&Ox3, GCS 15, CN II-XII unremarkable. Strength and sensation globally intact.  Skin: Warm, Dry, Intact, no visible lesions    ED Course, Procedures, & Data      Procedures            EKG Interpretation:      Interpreted by Michael Abreu MD  Time reviewed: 2130  Symptoms at time of EKG: Tachycardia, chest pain   Rhythm: sinus tachycardia  Rate: 110-120  Axis: Normal  Ectopy: none  Conduction: normal  ST Segments/ T Waves: No ST-T wave changes and No acute ischemic changes  Q Waves: none    Clinical Impression: Sinus tachycardia, no JUVENTINO               Results for orders placed or performed during the hospital encounter of 05/25/24   CT Chest Pulmonary Embolism w Contrast     Status: None    Narrative    EXAM: CT CHEST PULMONARY EMBOLISM W  CONTRAST  LOCATION: [facility]  DATE: 5/25/2024    INDICATION: Chest pain and tachycardia.  COMPARISON: None.  TECHNIQUE: CT chest pulmonary angiogram during arterial phase injection of IV contrast. Multiplanar reformats and MIP reconstructions were performed. Dose reduction techniques were used.   CONTRAST: 64 ml of Isovue 370    FINDINGS:  ANGIOGRAM CHEST: Pulmonary arteries are normal caliber and negative for pulmonary emboli. Thoracic aorta is negative for dissection. No CT evidence of right heart strain.    LUNGS AND PLEURA: No focal airspace disease. No pleural effusion or pneumothorax.    MEDIASTINUM/AXILLAE: No lymphadenopathy. No pericardial effusion.    CORONARY ARTERY CALCIFICATION: None.    UPPER ABDOMEN: Unremarkable.    MUSCULOSKELETAL: Mild multilevel degenerative changes of the spine.      Impression    IMPRESSION:  1.  No acute findings in the chest. No acute pulmonary embolism.   D dimer quantitative     Status: Abnormal   Result Value Ref Range    D-Dimer Quantitative 1.98 (H) 0.00 - 0.50 ug/mL FEU    Narrative    This D-dimer assay is intended for use in conjunction with a clinical pretest probability assessment model to exclude pulmonary embolism (PE) and deep venous thrombosis (DVT) in outpatients suspected of PE or DVT. The cut-off value is 0.50 ug/mL FEU.   Basic metabolic panel     Status: Abnormal   Result Value Ref Range    Sodium 135 135 - 145 mmol/L    Potassium 3.9 3.4 - 5.3 mmol/L    Chloride 102 98 - 107 mmol/L    Carbon Dioxide (CO2) 23 22 - 29 mmol/L    Anion Gap 10 7 - 15 mmol/L    Urea Nitrogen 4.2 (L) 6.0 - 20.0 mg/dL    Creatinine 0.42 (L) 0.51 - 0.95 mg/dL    GFR Estimate >90 >60 mL/min/1.73m2    Calcium 9.0 8.6 - 10.0 mg/dL    Glucose 104 (H) 70 - 99 mg/dL   Lipase     Status: Normal   Result Value Ref Range    Lipase 27 13 - 60 U/L   Hepatic function panel     Status: Abnormal   Result Value Ref Range    Protein Total 6.4 6.4 -  8.3 g/dL    Albumin 3.2 (L) 3.5 - 5.2 g/dL    Bilirubin Total 0.3 <=1.2 mg/dL    Alkaline Phosphatase 125 40 - 150 U/L    AST 21 0 - 45 U/L    ALT 8 0 - 50 U/L    Bilirubin Direct <0.20 0.00 - 0.30 mg/dL   Troponin T, High Sensitivity     Status: Normal   Result Value Ref Range    Troponin T, High Sensitivity <6 <=14 ng/L   Extra Tube ([place] Draw)     Status: None    Narrative    The following orders were created for panel order Extra Tube ([place] Draw).  Procedure                               Abnormality         Status                     ---------                               -----------         ------                     Extra Red Top Tube[[id number]]                               Final result                 Please view results for these tests on the individual orders.   CBC with platelets and differential     Status: Abnormal   Result Value Ref Range    WBC Count 12.3 (H) 4.0 - 11.0 10e3/uL    RBC Count 3.88 3.80 - 5.20 10e6/uL    Hemoglobin 9.0 (L) 11.7 - 15.7 g/dL    Hematocrit 30.3 (L) 35.0 - 47.0 %    MCV 78 78 - 100 fL    MCH 23.2 (L) 26.5 - 33.0 pg    MCHC 29.7 (L) 31.5 - 36.5 g/dL    RDW 19.0 (H) 10.0 - 15.0 %    Platelet Count 221 150 - 450 10e3/uL    % Neutrophils 77 %    % Lymphocytes 10 %    % Monocytes 9 %    % Eosinophils 0 %    % Basophils 0 %    % Immature Granulocytes 4 %    NRBCs per 100 WBC 1 (H) <1 /100    Absolute Neutrophils 9.4 (H) 1.6 - 8.3 10e3/uL    Absolute Lymphocytes 1.3 0.8 - 5.3 10e3/uL    Absolute Monocytes 1.1 0.0 - 1.3 10e3/uL    Absolute Eosinophils 0.0 0.0 - 0.7 10e3/uL    Absolute Basophils 0.1 0.0 - 0.2 10e3/uL    Absolute Immature Granulocytes 0.5 (H) <=0.4 10e3/uL    Absolute NRBCs 0.2 10e3/uL   Extra Red Top Tube     Status: None   Result Value Ref Range    Hold Specimen [facility]    EKG 12 lead     Status: None (Preliminary result)   Result Value Ref Range    Systolic Blood Pressure  mmHg    Diastolic Blood Pressure  mmHg    Ventricular Rate 116 BPM    Atrial Rate 116 BPM     NY Interval 154 ms    QRS Duration 72 ms     ms    QTc 447 ms    P Axis 49 degrees    R AXIS 33 degrees    T Axis 31 degrees    Interpretation ECG       Sinus tachycardia  Possible Left atrial enlargement  Borderline ECG     CBC with platelets differential     Status: Abnormal    Narrative    The following orders were created for panel order CBC with platelets differential.  Procedure                               Abnormality         Status                     ---------                               -----------         ------                     CBC with platelets and d...[[id number]]  Abnormal            Final result                 Please view results for these tests on the individual orders.     Medications   alum & mag hydroxide-simethicone (MAALOX) suspension 15 mL (15 mLs Oral $Given 5/25/24 2223)   lidocaine (viscous) (XYLOCAINE) 2 % solution 10 mL (10 mLs Mouth/Throat $Given 5/25/24 2223)   sodium chloride 0.9 % bag 500mL for CT scan flush use (84 mLs As instructed $Given 5/25/24 2342)   iopamidol (ISOVUE-370) solution 100 mL (64 mLs Intravenous $Given 5/25/24 2340)   sucralfate (CARAFATE) suspension 1 g (1 g Oral $Given 5/26/24 0055)     Labs Ordered and Resulted from Time of ED Arrival to Time of ED Departure   D DIMER QUANTITATIVE - Abnormal       Result Value    D-Dimer Quantitative 1.98 (*)    BASIC METABOLIC PANEL - Abnormal    Sodium 135      Potassium 3.9      Chloride 102      Carbon Dioxide (CO2) 23      Anion Gap 10      Urea Nitrogen 4.2 (*)     Creatinine 0.42 (*)     GFR Estimate >90      Calcium 9.0      Glucose 104 (*)    HEPATIC FUNCTION PANEL - Abnormal    Protein Total 6.4      Albumin 3.2 (*)     Bilirubin Total 0.3      Alkaline Phosphatase 125      AST 21      ALT 8      Bilirubin Direct <0.20     CBC WITH PLATELETS AND DIFFERENTIAL - Abnormal    WBC Count 12.3 (*)     RBC Count 3.88      Hemoglobin 9.0 (*)     Hematocrit 30.3 (*)     MCV 78      MCH 23.2 (*)     MCHC 29.7 (*)      RDW 19.0 (*)     Platelet Count 221      % Neutrophils 77      % Lymphocytes 10      % Monocytes 9      % Eosinophils 0      % Basophils 0      % Immature Granulocytes 4      NRBCs per 100 WBC 1 (*)     Absolute Neutrophils 9.4 (*)     Absolute Lymphocytes 1.3      Absolute Monocytes 1.1      Absolute Eosinophils 0.0      Absolute Basophils 0.1      Absolute Immature Granulocytes 0.5 (*)     Absolute NRBCs 0.2     LIPASE - Normal    Lipase 27     TROPONIN T, HIGH SENSITIVITY - Normal    Troponin T, High Sensitivity <6     ROUTINE UA WITH MICROSCOPIC REFLEX TO CULTURE     CT Chest Pulmonary Embolism w Contrast   Final Result   IMPRESSION:   1.  No acute findings in the chest. No acute pulmonary embolism.             Critical care was not performed.     Medical Decision Making  The patient's presentation was of high complexity (an acute health issue posing potential threat to life or bodily function).    The patient's evaluation involved:  ordering and/or review of 3+ test(s) in this encounter (see separate area of note for details)    The patient's management necessitated moderate risk (prescription drug management including medications given in the ED).    Assessment & Plan    Chrissy Hernandez is a 34 year old female  at 34w1d gestation with PMH notable for anemia in pregnancy, history of PE who presents with 1 hour of chest pain.  On arrival patient with some tachycardia that slightly improved while in the ED.  Blood pressure reassuring.  Afebrile.  Breathing easily on room air in no acute distress.  EKG with no acute ischemic changes.  Patient with a history of PE.  We discussed obtaining a D-dimer and using a trimester adjusted threshold which she was agreeable to.    Labs are reassuring with an undetectable troponin, lipase is within normal limits.  CBC with a slight leukocytosis that I favor being reactive at this time given lack of other infectious signs or symptoms.  The patient has a mild anemia that  is stable from 2 weeks ago.  Metabolic panel reassuring.  No acute urinary symptoms.  D-dimer was elevated beyond third trimester threshold.  Patient was comfortable proceeding with CT scan after discussion of risks and benefits.    Patient with slight improvement after GI cocktail but not complete resolution.  Discussed that based on the examination laboratory and imaging studies there is concern that this could be related to worsening gastritis issues.  Discussed using her PPI twice daily and will prescribe some sucralfate.  Based on a nonperitoneal abdomen I do not think additional imaging of the abdomen was necessary today.    Discussed follow-up with patient's primary care provider and OB team.  Discussed return precautions.  Patient is comfortable with this proposed plan, requested a refill of prenatal vitamins before leaving and has no further questions comments or concerns at time of discharge.    I have reviewed the nursing notes. I have reviewed the findings, diagnosis, plan and need for follow up with the patient.    Discharge Medication List as of 5/26/2024 12:51 AM        START taking these medications    Details   Prenatal Vit-Fe Fumarate-FA (PRENATAL MULTIVITAMIN W/IRON) 27-0.8 MG tablet Take 1 tablet by mouth daily for 30 days, Disp-30 tablet, R-0, E-Prescribe      sucralfate (CARAFATE) 1 GM/10ML suspension Take 10 mLs (1 g) by mouth 4 times daily for 14 days, Disp-560 mL, R-0, E-Prescribe             Final diagnoses:   Chest pain, unspecified type   Epigastric pain     Michael Abreu Jr., MD   [facility] EMERGENCY DEPARTMENT  5/25/2024     Michael Abreu MD  05/26/24 0154

## 2024-05-26 NOTE — DISCHARGE INSTRUCTIONS
Thank you for coming to the [facility] ED.  Your evaluation today was reassuring against immediately dangerous causes of your symptoms.  Your symptoms may be related to some gastritis and epigastric discomfort which can be seen in the later stages of pregnancy.    Please call your obstetrician first thing next week to schedule a follow-up appointment.    Please be sure to take your acid blocking medication, Prilosec twice daily as prescribed rather than once daily as you are currently taking it.  Please also use the Carafate that we are prescribing as directed.    You develop new or concerning symptoms before you are seen in your OB clinic for follow-up please feel free to return to the emergency department.

## 2024-05-27 LAB
ATRIAL RATE - MUSE: 116 BPM
DIASTOLIC BLOOD PRESSURE - MUSE: NORMAL MMHG
INTERPRETATION ECG - MUSE: NORMAL
P AXIS - MUSE: 49 DEGREES
PR INTERVAL - MUSE: 154 MS
QRS DURATION - MUSE: 72 MS
QT - MUSE: 322 MS
QTC - MUSE: 447 MS
R AXIS - MUSE: 33 DEGREES
SYSTOLIC BLOOD PRESSURE - MUSE: NORMAL MMHG
T AXIS - MUSE: 31 DEGREES
VENTRICULAR RATE- MUSE: 116 BPM

## 2024-05-30 ENCOUNTER — INFUSION THERAPY VISIT (OUTPATIENT)
Dept: TRANSPLANT | Facility: CLINIC | Age: 34
End: 2024-05-30
Attending: OBSTETRICS & GYNECOLOGY
Payer: COMMERCIAL

## 2024-05-30 ENCOUNTER — TELEPHONE (OUTPATIENT)
Dept: OBGYN | Facility: CLINIC | Age: 34
End: 2024-05-30
Payer: COMMERCIAL

## 2024-05-30 VITALS
DIASTOLIC BLOOD PRESSURE: 71 MMHG | RESPIRATION RATE: 16 BRPM | TEMPERATURE: 98.8 F | HEART RATE: 90 BPM | OXYGEN SATURATION: 100 % | SYSTOLIC BLOOD PRESSURE: 112 MMHG

## 2024-05-30 DIAGNOSIS — O99.013 ANEMIA IN PREGNANCY, THIRD TRIMESTER: Primary | ICD-10-CM

## 2024-05-30 PROCEDURE — 250N000011 HC RX IP 250 OP 636: Performed by: OBSTETRICS & GYNECOLOGY

## 2024-05-30 PROCEDURE — 258N000003 HC RX IP 258 OP 636: Performed by: OBSTETRICS & GYNECOLOGY

## 2024-05-30 PROCEDURE — 96366 THER/PROPH/DIAG IV INF ADDON: CPT

## 2024-05-30 PROCEDURE — 96365 THER/PROPH/DIAG IV INF INIT: CPT

## 2024-05-30 RX ORDER — EPINEPHRINE 1 MG/ML
0.3 INJECTION, SOLUTION INTRAMUSCULAR; SUBCUTANEOUS EVERY 5 MIN PRN
OUTPATIENT
Start: 2024-06-01

## 2024-05-30 RX ORDER — HEPARIN SODIUM (PORCINE) LOCK FLUSH IV SOLN 100 UNIT/ML 100 UNIT/ML
5 SOLUTION INTRAVENOUS
OUTPATIENT
Start: 2024-06-01

## 2024-05-30 RX ORDER — HEPARIN SODIUM,PORCINE 10 UNIT/ML
5-20 VIAL (ML) INTRAVENOUS DAILY PRN
OUTPATIENT
Start: 2024-06-01

## 2024-05-30 RX ORDER — ALBUTEROL SULFATE 0.83 MG/ML
2.5 SOLUTION RESPIRATORY (INHALATION)
OUTPATIENT
Start: 2024-06-01

## 2024-05-30 RX ORDER — DIPHENHYDRAMINE HYDROCHLORIDE 50 MG/ML
50 INJECTION INTRAMUSCULAR; INTRAVENOUS
Start: 2024-06-01

## 2024-05-30 RX ORDER — METHYLPREDNISOLONE SODIUM SUCCINATE 125 MG/2ML
125 INJECTION, POWDER, LYOPHILIZED, FOR SOLUTION INTRAMUSCULAR; INTRAVENOUS
Start: 2024-06-01

## 2024-05-30 RX ORDER — ALBUTEROL SULFATE 90 UG/1
1-2 AEROSOL, METERED RESPIRATORY (INHALATION)
Start: 2024-06-01

## 2024-05-30 RX ADMIN — IRON SUCROSE 300 MG: 20 INJECTION, SOLUTION INTRAVENOUS at 09:12

## 2024-05-30 ASSESSMENT — PAIN SCALES - GENERAL: PAINLEVEL: NO PAIN (0)

## 2024-05-30 NOTE — PROGRESS NOTES
Infusion Nursing Note:  Chrissy Hernandez presents today for 2nd dose Venofer.    Patient seen by provider today: No   present during visit today: Not Applicable.    Note: Chrissy here today feeling fatigued, no other complaints. VSS and pt received 300mg Venofer over 90 minutes. Tolerated without difficulty.      Intravenous Access:  Peripheral IV placed.    Treatment Conditions:  Not Applicable.      Post Infusion Assessment:  Patient tolerated infusion without incident.  Blood return noted pre and post infusion.  No evidence of extravasations.  Access discontinued per protocol.       Discharge Plan:   Patient discharged in stable condition accompanied by: self.  Departure Mode: Ambulatory.      Giselle Allred RN

## 2024-05-30 NOTE — TELEPHONE ENCOUNTER
PAPERWORK REQUEST    Form received on: 05/14/24   How was form received: Fax  Preferred Provider: Kendra Kaur MD  Type of form: Other [facility]  Date needed by: as soon as available  What to do with form once completed: Please fax to   Where was form placed?: already signed by KD  Has an MEGHAN been signed? Not Applicable    Additional Notes: form already signed by KD, filled out form and faxed back to number listed on form. Scanned to HIM.       [place]bella  She/her/hers  [facility]

## 2024-06-14 ENCOUNTER — PRENATAL OFFICE VISIT (OUTPATIENT)
Dept: MIDWIFE SERVICES | Facility: CLINIC | Age: 34
End: 2024-06-14
Payer: COMMERCIAL

## 2024-06-14 VITALS — DIASTOLIC BLOOD PRESSURE: 62 MMHG | BODY MASS INDEX: 31.78 KG/M2 | WEIGHT: 191 LBS | SYSTOLIC BLOOD PRESSURE: 118 MMHG

## 2024-06-14 DIAGNOSIS — O09.523 MULTIGRAVIDA OF ADVANCED MATERNAL AGE IN THIRD TRIMESTER: Primary | ICD-10-CM

## 2024-06-14 DIAGNOSIS — O99.013 ANEMIA IN PREGNANCY, THIRD TRIMESTER: Primary | ICD-10-CM

## 2024-06-14 DIAGNOSIS — Z36.85 ANTENATAL SCREENING FOR STREPTOCOCCUS B: ICD-10-CM

## 2024-06-14 DIAGNOSIS — N90.812: ICD-10-CM

## 2024-06-14 DIAGNOSIS — O99.013 ANEMIA IN PREGNANCY, THIRD TRIMESTER: ICD-10-CM

## 2024-06-14 DIAGNOSIS — Z3A.37 37 WEEKS GESTATION OF PREGNANCY: ICD-10-CM

## 2024-06-14 DIAGNOSIS — Z86.711 HISTORY OF PULMONARY EMBOLUS (PE): ICD-10-CM

## 2024-06-14 LAB
ERYTHROCYTE [DISTWIDTH] IN BLOOD BY AUTOMATED COUNT: 24.3 % (ref 10–15)
HCT VFR BLD AUTO: 34 % (ref 35–47)
HGB BLD-MCNC: 9.8 G/DL (ref 11.7–15.7)
MCH RBC QN AUTO: 24 PG (ref 26.5–33)
MCHC RBC AUTO-ENTMCNC: 28.8 G/DL (ref 31.5–36.5)
MCV RBC AUTO: 83 FL (ref 78–100)
PLATELET # BLD AUTO: 203 10E3/UL (ref 150–450)
RBC # BLD AUTO: 4.09 10E6/UL (ref 3.8–5.2)
WBC # BLD AUTO: 9 10E3/UL (ref 4–11)

## 2024-06-14 PROCEDURE — 85027 COMPLETE CBC AUTOMATED: CPT | Performed by: ADVANCED PRACTICE MIDWIFE

## 2024-06-14 PROCEDURE — 36415 COLL VENOUS BLD VENIPUNCTURE: CPT | Performed by: ADVANCED PRACTICE MIDWIFE

## 2024-06-14 PROCEDURE — 87653 STREP B DNA AMP PROBE: CPT | Performed by: ADVANCED PRACTICE MIDWIFE

## 2024-06-14 PROCEDURE — 99207 PR PRENATAL VISIT: CPT | Performed by: ADVANCED PRACTICE MIDWIFE

## 2024-06-14 RX ORDER — MULTIVIT WITH MINERALS/LUTEIN
250 TABLET ORAL DAILY
Qty: 90 TABLET | Refills: 1 | Status: SHIPPED | OUTPATIENT
Start: 2024-06-14

## 2024-06-14 RX ORDER — LANOLIN ALCOHOL/MO/W.PET/CERES
1000 CREAM (GRAM) TOPICAL DAILY
Qty: 90 TABLET | Refills: 1 | Status: SHIPPED | OUTPATIENT
Start: 2024-06-14

## 2024-06-14 RX ORDER — FERROUS GLUCONATE 324(38)MG
324 TABLET ORAL
Qty: 90 TABLET | Refills: 1 | Status: ON HOLD | OUTPATIENT
Start: 2024-06-14 | End: 2024-07-03

## 2024-06-14 NOTE — PROGRESS NOTES
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 clinic, plans to deliver at [facility] this will be her first baby at [place], Fridays were full so so she decided to schedule here via [employer], was here once about a year ago to see NP, she does have the rest of her visits scheduled out with her own clinic/providers  Fetal movement: positive  Denies vaginal bleeding/lof, a lot of contractions, denies signs and symptoms preeclampsia  Fetal kick/movement counts reviewed  Labor signs and symptoms discussed, aware of numbers to call  Discussed warning signs, signs and symptoms preeclampsia  Discussed end of pregnancy discomforts  Discussed scheduling with her providers and delivery at [place], reminded of phone number to call  BSUS shows vertex presentation  SVE 2.5/50/-1, medium/ mid, sutures palpated  CBC done today, has not taken iron or done infusions, anemia dx at 32 weeks, rpt done today  Discussed difference between BH contractions and labor, if feeling like regular contractions or labor occurs she should call or go in right away given her TOLAC status  Return to clinic 1 week    KENROY Weiss, CNM

## 2024-06-14 NOTE — RESULT ENCOUNTER NOTE
Informed of hgb 9.8, per prenatal note has not been taking oral iron. Rx for iron, vit c and vit b12 sent. Pt has appt for infusion on 6/24    KENROY Barrios CNM

## 2024-06-15 LAB — GP B STREP DNA SPEC QL NAA+PROBE: NEGATIVE

## 2024-06-21 ENCOUNTER — PRENATAL OFFICE VISIT (OUTPATIENT)
Dept: OBGYN | Facility: CLINIC | Age: 34
End: 2024-06-21
Payer: COMMERCIAL

## 2024-06-21 VITALS
WEIGHT: 188 LBS | SYSTOLIC BLOOD PRESSURE: 111 MMHG | OXYGEN SATURATION: 100 % | BODY MASS INDEX: 31.28 KG/M2 | DIASTOLIC BLOOD PRESSURE: 66 MMHG | HEART RATE: 87 BPM

## 2024-06-21 DIAGNOSIS — O09.529 HIGH-RISK PREGNANCY, ELDERLY MULTIGRAVIDA, UNSPECIFIED TRIMESTER: Primary | ICD-10-CM

## 2024-06-21 PROCEDURE — 99207 PR PRENATAL VISIT: CPT | Performed by: OBSTETRICS & GYNECOLOGY

## 2024-06-21 RX ORDER — CITRIC ACID/SODIUM CITRATE 334-500MG
30 SOLUTION, ORAL ORAL ONCE
Status: CANCELLED | OUTPATIENT
Start: 2024-06-21 | End: 2024-06-21

## 2024-06-21 RX ORDER — KETOROLAC TROMETHAMINE 30 MG/ML
30 INJECTION, SOLUTION INTRAMUSCULAR; INTRAVENOUS
Status: CANCELLED | OUTPATIENT
Start: 2024-06-21

## 2024-06-21 RX ORDER — OXYTOCIN/0.9 % SODIUM CHLORIDE 30/500 ML
100-340 PLASTIC BAG, INJECTION (ML) INTRAVENOUS CONTINUOUS PRN
Status: CANCELLED | OUTPATIENT
Start: 2024-06-21

## 2024-06-21 RX ORDER — SODIUM CHLORIDE, SODIUM LACTATE, POTASSIUM CHLORIDE, CALCIUM CHLORIDE 600; 310; 30; 20 MG/100ML; MG/100ML; MG/100ML; MG/100ML
INJECTION, SOLUTION INTRAVENOUS CONTINUOUS
Status: CANCELLED | OUTPATIENT
Start: 2024-06-21

## 2024-06-21 RX ORDER — LOPERAMIDE HCL 2 MG
2 CAPSULE ORAL
Status: CANCELLED | OUTPATIENT
Start: 2024-06-21

## 2024-06-21 RX ORDER — LOPERAMIDE HCL 2 MG
4 CAPSULE ORAL
Status: CANCELLED | OUTPATIENT
Start: 2024-06-21

## 2024-06-21 RX ORDER — NALOXONE HYDROCHLORIDE 0.4 MG/ML
0.2 INJECTION, SOLUTION INTRAMUSCULAR; INTRAVENOUS; SUBCUTANEOUS
Status: CANCELLED | OUTPATIENT
Start: 2024-06-21

## 2024-06-21 RX ORDER — PROCHLORPERAZINE 25 MG
25 SUPPOSITORY, RECTAL RECTAL EVERY 12 HOURS PRN
Status: CANCELLED | OUTPATIENT
Start: 2024-06-21

## 2024-06-21 RX ORDER — PROCHLORPERAZINE MALEATE 5 MG
10 TABLET ORAL EVERY 6 HOURS PRN
Status: CANCELLED | OUTPATIENT
Start: 2024-06-21

## 2024-06-21 RX ORDER — LIDOCAINE 40 MG/G
CREAM TOPICAL
Status: CANCELLED | OUTPATIENT
Start: 2024-06-21

## 2024-06-21 RX ORDER — ONDANSETRON 2 MG/ML
4 INJECTION INTRAMUSCULAR; INTRAVENOUS EVERY 6 HOURS PRN
Status: CANCELLED | OUTPATIENT
Start: 2024-06-21

## 2024-06-21 RX ORDER — CARBOPROST TROMETHAMINE 250 UG/ML
250 INJECTION, SOLUTION INTRAMUSCULAR
Status: CANCELLED | OUTPATIENT
Start: 2024-06-21

## 2024-06-21 RX ORDER — OXYTOCIN 10 [USP'U]/ML
10 INJECTION, SOLUTION INTRAMUSCULAR; INTRAVENOUS
Status: CANCELLED | OUTPATIENT
Start: 2024-06-21

## 2024-06-21 RX ORDER — OXYTOCIN/0.9 % SODIUM CHLORIDE 30/500 ML
340 PLASTIC BAG, INJECTION (ML) INTRAVENOUS CONTINUOUS PRN
Status: CANCELLED | OUTPATIENT
Start: 2024-06-21

## 2024-06-21 RX ORDER — METOCLOPRAMIDE HYDROCHLORIDE 5 MG/ML
10 INJECTION INTRAMUSCULAR; INTRAVENOUS EVERY 6 HOURS PRN
Status: CANCELLED | OUTPATIENT
Start: 2024-06-21

## 2024-06-21 RX ORDER — NALOXONE HYDROCHLORIDE 0.4 MG/ML
0.4 INJECTION, SOLUTION INTRAMUSCULAR; INTRAVENOUS; SUBCUTANEOUS
Status: CANCELLED | OUTPATIENT
Start: 2024-06-21

## 2024-06-21 RX ORDER — CITRIC ACID/SODIUM CITRATE 334-500MG
30 SOLUTION, ORAL ORAL
Status: CANCELLED | OUTPATIENT
Start: 2024-06-21

## 2024-06-21 RX ORDER — METOCLOPRAMIDE 5 MG/1
10 TABLET ORAL EVERY 6 HOURS PRN
Status: CANCELLED | OUTPATIENT
Start: 2024-06-21

## 2024-06-21 RX ORDER — IBUPROFEN 200 MG
800 TABLET ORAL
Status: CANCELLED | OUTPATIENT
Start: 2024-06-21

## 2024-06-21 RX ORDER — MISOPROSTOL 100 UG/1
400 TABLET ORAL
Status: CANCELLED | OUTPATIENT
Start: 2024-06-21

## 2024-06-21 RX ORDER — MISOPROSTOL 200 UG/1
800 TABLET ORAL
Status: CANCELLED | OUTPATIENT
Start: 2024-06-21

## 2024-06-21 RX ORDER — METHYLERGONOVINE MALEATE 0.2 MG/ML
200 INJECTION INTRAVENOUS
Status: CANCELLED | OUTPATIENT
Start: 2024-06-21

## 2024-06-21 RX ORDER — ONDANSETRON 4 MG/1
4 TABLET, ORALLY DISINTEGRATING ORAL EVERY 6 HOURS PRN
Status: CANCELLED | OUTPATIENT
Start: 2024-06-21

## 2024-06-21 NOTE — PROGRESS NOTES
Patient reports she had contractions every 20 minutes that were at their worst from 4-7am.  They have gotten better now.  She denies any vaginal bleeding, leaking fluid, changes in vision, chest pain, chest pressure, shortness of breath, edema.  She did have a headache earlier today, tylenol resolved it.    She has pp OTC meds at home.  Discussed s/sx of labor and ROM.  Has infusion visit scheduled for 6/24/24  Labor orders signed and held.  Next visit 6/28/24    Laura Culp MD

## 2024-06-28 ENCOUNTER — PRENATAL OFFICE VISIT (OUTPATIENT)
Dept: OBGYN | Facility: CLINIC | Age: 34
End: 2024-06-28
Payer: COMMERCIAL

## 2024-06-28 VITALS
WEIGHT: 192 LBS | SYSTOLIC BLOOD PRESSURE: 131 MMHG | DIASTOLIC BLOOD PRESSURE: 75 MMHG | HEART RATE: 90 BPM | BODY MASS INDEX: 31.95 KG/M2 | TEMPERATURE: 98.5 F

## 2024-06-28 DIAGNOSIS — O34.219 DESIRES VBAC (VAGINAL BIRTH AFTER CESAREAN) TRIAL: ICD-10-CM

## 2024-06-28 DIAGNOSIS — O34.219 HISTORY OF CESAREAN DELIVERY AFFECTING PREGNANCY: ICD-10-CM

## 2024-06-28 DIAGNOSIS — Z64.1 GRAND MULTIPARITY: ICD-10-CM

## 2024-06-28 DIAGNOSIS — O09.523 MULTIGRAVIDA OF ADVANCED MATERNAL AGE IN THIRD TRIMESTER: Primary | ICD-10-CM

## 2024-06-28 PROCEDURE — 99207 PR PRENATAL VISIT: CPT | Performed by: OBSTETRICS & GYNECOLOGY

## 2024-06-28 NOTE — PROGRESS NOTES
39w0d  Has 1-2 contractions per hour for past week. No leaking or bleeding. Wants to be done, desires TOLAC. Has to work Sat and Sun so would like to be induced Mon. Cervix is favorable. Membranes stripped today and IOL sched for 7/1 at 730a. Questions answered. Reviewed s/sx labor, kick counts, when to go to hospital. Wants epidural.   Lexii Gan MD

## 2024-06-29 ENCOUNTER — HEALTH MAINTENANCE LETTER (OUTPATIENT)
Age: 34
End: 2024-06-29

## 2024-06-30 ENCOUNTER — HOSPITAL ENCOUNTER (OUTPATIENT)
Facility: CLINIC | Age: 34
End: 2024-06-30
Attending: OBSTETRICS & GYNECOLOGY | Admitting: OBSTETRICS & GYNECOLOGY
Payer: COMMERCIAL

## 2024-07-02 ENCOUNTER — HOSPITAL ENCOUNTER (INPATIENT)
Facility: CLINIC | Age: 34
LOS: 1 days | Discharge: HOME | End: 2024-07-03
Attending: OBSTETRICS & GYNECOLOGY | Admitting: OBSTETRICS & GYNECOLOGY
Payer: COMMERCIAL

## 2024-07-02 DIAGNOSIS — D62 ANEMIA DUE TO BLOOD LOSS, ACUTE: ICD-10-CM

## 2024-07-02 DIAGNOSIS — Z86.711 HISTORY OF PULMONARY EMBOLUS (PE): ICD-10-CM

## 2024-07-02 DIAGNOSIS — O34.219 VAGINAL BIRTH AFTER CESAREAN (VBAC): Primary | ICD-10-CM

## 2024-07-02 PROBLEM — Z34.90 PREGNANCY: Status: ACTIVE | Noted: 2024-07-02

## 2024-07-02 LAB
ABO/RH(D): NORMAL
ANTIBODY SCREEN: NEGATIVE
APTT PPP: 28 SECONDS (ref 22–38)
APTT PPP: 30 SECONDS (ref 22–38)
BLD PROD TYP BPU: NORMAL
BLD PROD TYP BPU: NORMAL
BLOOD COMPONENT TYPE: NORMAL
BLOOD COMPONENT TYPE: NORMAL
CODING SYSTEM: NORMAL
CODING SYSTEM: NORMAL
CROSSMATCH: NORMAL
CROSSMATCH: NORMAL
ERYTHROCYTE [DISTWIDTH] IN BLOOD BY AUTOMATED COUNT: 22.2 % (ref 10–15)
ERYTHROCYTE [DISTWIDTH] IN BLOOD BY AUTOMATED COUNT: 22.4 % (ref 10–15)
ERYTHROCYTE [DISTWIDTH] IN BLOOD BY AUTOMATED COUNT: 22.6 % (ref 10–15)
FIBRINOGEN PPP-MCNC: 469 MG/DL (ref 170–490)
FIBRINOGEN PPP-MCNC: 510 MG/DL (ref 170–490)
HCT VFR BLD AUTO: 26.5 % (ref 35–47)
HCT VFR BLD AUTO: 31.5 % (ref 35–47)
HCT VFR BLD AUTO: 37.5 % (ref 35–47)
HGB BLD-MCNC: 11.3 G/DL (ref 11.7–15.7)
HGB BLD-MCNC: 11.3 G/DL (ref 11.7–15.7)
HGB BLD-MCNC: 8.1 G/DL (ref 11.7–15.7)
HGB BLD-MCNC: 9.6 G/DL (ref 11.7–15.7)
HOLD SPECIMEN: NORMAL
INR PPP: 1.05 (ref 0.85–1.15)
INR PPP: 1.12 (ref 0.85–1.15)
MCH RBC QN AUTO: 24.8 PG (ref 26.5–33)
MCH RBC QN AUTO: 24.9 PG (ref 26.5–33)
MCH RBC QN AUTO: 25 PG (ref 26.5–33)
MCHC RBC AUTO-ENTMCNC: 30.1 G/DL (ref 31.5–36.5)
MCHC RBC AUTO-ENTMCNC: 30.5 G/DL (ref 31.5–36.5)
MCHC RBC AUTO-ENTMCNC: 30.6 G/DL (ref 31.5–36.5)
MCV RBC AUTO: 82 FL (ref 78–100)
PLATELET # BLD AUTO: 194 10E3/UL (ref 150–450)
PLATELET # BLD AUTO: 209 10E3/UL (ref 150–450)
PLATELET # BLD AUTO: 221 10E3/UL (ref 150–450)
PLATELET # BLD AUTO: 238 10E3/UL (ref 150–450)
RBC # BLD AUTO: 3.25 10E6/UL (ref 3.8–5.2)
RBC # BLD AUTO: 3.84 10E6/UL (ref 3.8–5.2)
RBC # BLD AUTO: 4.56 10E6/UL (ref 3.8–5.2)
SPECIMEN EXPIRATION DATE: NORMAL
T PALLIDUM AB SER QL: NONREACTIVE
UNIT ABO/RH: NORMAL
UNIT ABO/RH: NORMAL
UNIT NUMBER: NORMAL
UNIT NUMBER: NORMAL
UNIT STATUS: NORMAL
UNIT STATUS: NORMAL
UNIT TYPE ISBT: 5100
UNIT TYPE ISBT: 5100
WBC # BLD AUTO: 12.1 10E3/UL (ref 4–11)
WBC # BLD AUTO: 14.6 10E3/UL (ref 4–11)
WBC # BLD AUTO: 16.3 10E3/UL (ref 4–11)

## 2024-07-02 PROCEDURE — 250N000009 HC RX 250: Performed by: OBSTETRICS & GYNECOLOGY

## 2024-07-02 PROCEDURE — 250N000011 HC RX IP 250 OP 636: Performed by: OBSTETRICS & GYNECOLOGY

## 2024-07-02 PROCEDURE — 258N000003 HC RX IP 258 OP 636: Performed by: OBSTETRICS & GYNECOLOGY

## 2024-07-02 PROCEDURE — 86900 BLOOD TYPING SEROLOGIC ABO: CPT

## 2024-07-02 PROCEDURE — 36415 COLL VENOUS BLD VENIPUNCTURE: CPT

## 2024-07-02 PROCEDURE — 250N000009 HC RX 250

## 2024-07-02 PROCEDURE — 722N000001 HC LABOR CARE VAGINAL DELIVERY SINGLE

## 2024-07-02 PROCEDURE — 0W3R7ZZ CONTROL BLEEDING IN GENITOURINARY TRACT, VIA NATURAL OR ARTIFICIAL OPENING: ICD-10-PCS | Performed by: OBSTETRICS & GYNECOLOGY

## 2024-07-02 PROCEDURE — 250N000011 HC RX IP 250 OP 636

## 2024-07-02 PROCEDURE — 85610 PROTHROMBIN TIME: CPT

## 2024-07-02 PROCEDURE — 86780 TREPONEMA PALLIDUM: CPT

## 2024-07-02 PROCEDURE — 250N000013 HC RX MED GY IP 250 OP 250 PS 637

## 2024-07-02 PROCEDURE — 85384 FIBRINOGEN ACTIVITY: CPT

## 2024-07-02 PROCEDURE — 120N000002 HC R&B MED SURG/OB UMMC

## 2024-07-02 PROCEDURE — 85049 AUTOMATED PLATELET COUNT: CPT

## 2024-07-02 PROCEDURE — 85730 THROMBOPLASTIN TIME PARTIAL: CPT

## 2024-07-02 PROCEDURE — 85018 HEMOGLOBIN: CPT

## 2024-07-02 PROCEDURE — 250N000013 HC RX MED GY IP 250 OP 250 PS 637: Performed by: OBSTETRICS & GYNECOLOGY

## 2024-07-02 PROCEDURE — 86923 COMPATIBILITY TEST ELECTRIC: CPT

## 2024-07-02 PROCEDURE — 0HQ9XZZ REPAIR PERINEUM SKIN, EXTERNAL APPROACH: ICD-10-PCS | Performed by: OBSTETRICS & GYNECOLOGY

## 2024-07-02 RX ORDER — OXYTOCIN/0.9 % SODIUM CHLORIDE 30/500 ML
100-340 PLASTIC BAG, INJECTION (ML) INTRAVENOUS CONTINUOUS PRN
Status: CANCELLED | OUTPATIENT
Start: 2024-07-02

## 2024-07-02 RX ORDER — HYDROCORTISONE 25 MG/G
CREAM TOPICAL 3 TIMES DAILY PRN
Status: DISCONTINUED | OUTPATIENT
Start: 2024-07-02 | End: 2024-07-03 | Stop reason: HOSPADM

## 2024-07-02 RX ORDER — KETOROLAC TROMETHAMINE 30 MG/ML
30 INJECTION, SOLUTION INTRAMUSCULAR; INTRAVENOUS
Status: CANCELLED | OUTPATIENT
Start: 2024-07-02

## 2024-07-02 RX ORDER — IBUPROFEN 800 MG/1
800 TABLET, FILM COATED ORAL
Status: DISCONTINUED | OUTPATIENT
Start: 2024-07-02 | End: 2024-07-03 | Stop reason: HOSPADM

## 2024-07-02 RX ORDER — NALOXONE HYDROCHLORIDE 0.4 MG/ML
0.2 INJECTION, SOLUTION INTRAMUSCULAR; INTRAVENOUS; SUBCUTANEOUS
Status: DISCONTINUED | OUTPATIENT
Start: 2024-07-02 | End: 2024-07-03 | Stop reason: HOSPADM

## 2024-07-02 RX ORDER — NALOXONE HYDROCHLORIDE 0.4 MG/ML
0.4 INJECTION, SOLUTION INTRAMUSCULAR; INTRAVENOUS; SUBCUTANEOUS
Status: CANCELLED | OUTPATIENT
Start: 2024-07-02

## 2024-07-02 RX ORDER — OXYTOCIN/0.9 % SODIUM CHLORIDE 30/500 ML
340 PLASTIC BAG, INJECTION (ML) INTRAVENOUS CONTINUOUS PRN
Status: DISCONTINUED | OUTPATIENT
Start: 2024-07-02 | End: 2024-07-03 | Stop reason: HOSPADM

## 2024-07-02 RX ORDER — KETOROLAC TROMETHAMINE 30 MG/ML
30 INJECTION, SOLUTION INTRAMUSCULAR; INTRAVENOUS
Status: DISCONTINUED | OUTPATIENT
Start: 2024-07-02 | End: 2024-07-03 | Stop reason: HOSPADM

## 2024-07-02 RX ORDER — MODIFIED LANOLIN
OINTMENT (GRAM) TOPICAL
Status: DISCONTINUED | OUTPATIENT
Start: 2024-07-02 | End: 2024-07-03 | Stop reason: HOSPADM

## 2024-07-02 RX ORDER — SODIUM CHLORIDE, SODIUM LACTATE, POTASSIUM CHLORIDE, CALCIUM CHLORIDE 600; 310; 30; 20 MG/100ML; MG/100ML; MG/100ML; MG/100ML
INJECTION, SOLUTION INTRAVENOUS
Status: COMPLETED
Start: 2024-07-02 | End: 2024-07-02

## 2024-07-02 RX ORDER — OXYTOCIN 10 [USP'U]/ML
10 INJECTION, SOLUTION INTRAMUSCULAR; INTRAVENOUS
Status: CANCELLED | OUTPATIENT
Start: 2024-07-02

## 2024-07-02 RX ORDER — FENTANYL CITRATE 50 UG/ML
INJECTION, SOLUTION INTRAMUSCULAR; INTRAVENOUS
Status: DISCONTINUED
Start: 2024-07-02 | End: 2024-07-02 | Stop reason: HOSPADM

## 2024-07-02 RX ORDER — PROCHLORPERAZINE 25 MG
25 SUPPOSITORY, RECTAL RECTAL EVERY 12 HOURS PRN
Status: DISCONTINUED | OUTPATIENT
Start: 2024-07-02 | End: 2024-07-02 | Stop reason: HOSPADM

## 2024-07-02 RX ORDER — METHYLERGONOVINE MALEATE 0.2 MG/ML
200 INJECTION INTRAVENOUS
Status: DISCONTINUED | OUTPATIENT
Start: 2024-07-02 | End: 2024-07-03 | Stop reason: HOSPADM

## 2024-07-02 RX ORDER — OXYTOCIN/0.9 % SODIUM CHLORIDE 30/500 ML
100-340 PLASTIC BAG, INJECTION (ML) INTRAVENOUS CONTINUOUS PRN
Status: DISCONTINUED | OUTPATIENT
Start: 2024-07-02 | End: 2024-07-03 | Stop reason: HOSPADM

## 2024-07-02 RX ORDER — NALOXONE HYDROCHLORIDE 0.4 MG/ML
0.4 INJECTION, SOLUTION INTRAMUSCULAR; INTRAVENOUS; SUBCUTANEOUS
Status: DISCONTINUED | OUTPATIENT
Start: 2024-07-02 | End: 2024-07-02 | Stop reason: HOSPADM

## 2024-07-02 RX ORDER — FENTANYL CITRATE 50 UG/ML
100 INJECTION, SOLUTION INTRAMUSCULAR; INTRAVENOUS ONCE
Status: COMPLETED | OUTPATIENT
Start: 2024-07-02 | End: 2024-07-02

## 2024-07-02 RX ORDER — OXYTOCIN 10 [USP'U]/ML
INJECTION, SOLUTION INTRAMUSCULAR; INTRAVENOUS
Status: DISCONTINUED
Start: 2024-07-02 | End: 2024-07-02 | Stop reason: HOSPADM

## 2024-07-02 RX ORDER — LOPERAMIDE HCL 2 MG
2 CAPSULE ORAL
Status: DISCONTINUED | OUTPATIENT
Start: 2024-07-02 | End: 2024-07-02 | Stop reason: HOSPADM

## 2024-07-02 RX ORDER — CITRIC ACID/SODIUM CITRATE 334-500MG
30 SOLUTION, ORAL ORAL
Status: CANCELLED | OUTPATIENT
Start: 2024-07-02

## 2024-07-02 RX ORDER — TRANEXAMIC ACID 10 MG/ML
1 INJECTION, SOLUTION INTRAVENOUS EVERY 30 MIN PRN
Status: CANCELLED | OUTPATIENT
Start: 2024-07-02

## 2024-07-02 RX ORDER — OXYTOCIN/0.9 % SODIUM CHLORIDE 30/500 ML
340 PLASTIC BAG, INJECTION (ML) INTRAVENOUS CONTINUOUS PRN
Status: DISCONTINUED | OUTPATIENT
Start: 2024-07-02 | End: 2024-07-02 | Stop reason: HOSPADM

## 2024-07-02 RX ORDER — LOPERAMIDE HCL 2 MG
2 CAPSULE ORAL
Status: CANCELLED | OUTPATIENT
Start: 2024-07-02

## 2024-07-02 RX ORDER — OXYTOCIN/0.9 % SODIUM CHLORIDE 30/500 ML
PLASTIC BAG, INJECTION (ML) INTRAVENOUS
Status: COMPLETED
Start: 2024-07-02 | End: 2024-07-02

## 2024-07-02 RX ORDER — CEFAZOLIN SODIUM 2 G/100ML
2 INJECTION, SOLUTION INTRAVENOUS ONCE
Status: COMPLETED | OUTPATIENT
Start: 2024-07-02 | End: 2024-07-02

## 2024-07-02 RX ORDER — CEFAZOLIN SODIUM/WATER 2 G/20 ML
2 SYRINGE (ML) INTRAVENOUS ONCE
Status: DISCONTINUED | OUTPATIENT
Start: 2024-07-02 | End: 2024-07-02

## 2024-07-02 RX ORDER — FENTANYL CITRATE-0.9 % NACL/PF 10 MCG/ML
100 PLASTIC BAG, INJECTION (ML) INTRAVENOUS EVERY 5 MIN PRN
Status: DISCONTINUED | OUTPATIENT
Start: 2024-07-02 | End: 2024-07-02 | Stop reason: HOSPADM

## 2024-07-02 RX ORDER — METOCLOPRAMIDE HYDROCHLORIDE 5 MG/ML
10 INJECTION INTRAMUSCULAR; INTRAVENOUS EVERY 6 HOURS PRN
Status: DISCONTINUED | OUTPATIENT
Start: 2024-07-02 | End: 2024-07-02 | Stop reason: HOSPADM

## 2024-07-02 RX ORDER — NALBUPHINE HYDROCHLORIDE 20 MG/ML
2.5-5 INJECTION, SOLUTION INTRAMUSCULAR; INTRAVENOUS; SUBCUTANEOUS EVERY 6 HOURS PRN
Status: DISCONTINUED | OUTPATIENT
Start: 2024-07-02 | End: 2024-07-03 | Stop reason: HOSPADM

## 2024-07-02 RX ORDER — METHYLERGONOVINE MALEATE 0.2 MG/ML
200 INJECTION INTRAVENOUS
Status: DISCONTINUED | OUTPATIENT
Start: 2024-07-02 | End: 2024-07-02 | Stop reason: HOSPADM

## 2024-07-02 RX ORDER — METOCLOPRAMIDE HYDROCHLORIDE 5 MG/ML
10 INJECTION INTRAMUSCULAR; INTRAVENOUS EVERY 6 HOURS PRN
Status: CANCELLED | OUTPATIENT
Start: 2024-07-02

## 2024-07-02 RX ORDER — NALOXONE HYDROCHLORIDE 0.4 MG/ML
0.4 INJECTION, SOLUTION INTRAMUSCULAR; INTRAVENOUS; SUBCUTANEOUS
Status: DISCONTINUED | OUTPATIENT
Start: 2024-07-02 | End: 2024-07-03 | Stop reason: HOSPADM

## 2024-07-02 RX ORDER — PROCHLORPERAZINE MALEATE 10 MG
10 TABLET ORAL EVERY 6 HOURS PRN
Status: CANCELLED | OUTPATIENT
Start: 2024-07-02

## 2024-07-02 RX ORDER — MISOPROSTOL 200 UG/1
400 TABLET ORAL
Status: CANCELLED | OUTPATIENT
Start: 2024-07-02

## 2024-07-02 RX ORDER — MISOPROSTOL 200 UG/1
400 TABLET ORAL
Status: DISCONTINUED | OUTPATIENT
Start: 2024-07-02 | End: 2024-07-03 | Stop reason: HOSPADM

## 2024-07-02 RX ORDER — MISOPROSTOL 200 UG/1
400 TABLET ORAL
Status: DISCONTINUED | OUTPATIENT
Start: 2024-07-02 | End: 2024-07-02 | Stop reason: HOSPADM

## 2024-07-02 RX ORDER — NALOXONE HYDROCHLORIDE 0.4 MG/ML
0.2 INJECTION, SOLUTION INTRAMUSCULAR; INTRAVENOUS; SUBCUTANEOUS
Status: DISCONTINUED | OUTPATIENT
Start: 2024-07-02 | End: 2024-07-02 | Stop reason: HOSPADM

## 2024-07-02 RX ORDER — NALOXONE HYDROCHLORIDE 0.4 MG/ML
0.2 INJECTION, SOLUTION INTRAMUSCULAR; INTRAVENOUS; SUBCUTANEOUS
Status: CANCELLED | OUTPATIENT
Start: 2024-07-02

## 2024-07-02 RX ORDER — OXYTOCIN 10 [USP'U]/ML
10 INJECTION, SOLUTION INTRAMUSCULAR; INTRAVENOUS
Status: DISCONTINUED | OUTPATIENT
Start: 2024-07-02 | End: 2024-07-02 | Stop reason: HOSPADM

## 2024-07-02 RX ORDER — CARBOPROST TROMETHAMINE 250 UG/ML
250 INJECTION, SOLUTION INTRAMUSCULAR
Status: CANCELLED | OUTPATIENT
Start: 2024-07-02

## 2024-07-02 RX ORDER — CITRIC ACID/SODIUM CITRATE 334-500MG
30 SOLUTION, ORAL ORAL
Status: DISCONTINUED | OUTPATIENT
Start: 2024-07-02 | End: 2024-07-02 | Stop reason: HOSPADM

## 2024-07-02 RX ORDER — ACETAMINOPHEN 325 MG/1
650 TABLET ORAL EVERY 4 HOURS PRN
Status: DISCONTINUED | OUTPATIENT
Start: 2024-07-02 | End: 2024-07-03 | Stop reason: HOSPADM

## 2024-07-02 RX ORDER — MISOPROSTOL 200 UG/1
800 TABLET ORAL
Status: DISCONTINUED | OUTPATIENT
Start: 2024-07-02 | End: 2024-07-02 | Stop reason: HOSPADM

## 2024-07-02 RX ORDER — IBUPROFEN 800 MG/1
800 TABLET, FILM COATED ORAL
Status: CANCELLED | OUTPATIENT
Start: 2024-07-02

## 2024-07-02 RX ORDER — IBUPROFEN 800 MG/1
800 TABLET, FILM COATED ORAL EVERY 6 HOURS PRN
Status: DISCONTINUED | OUTPATIENT
Start: 2024-07-02 | End: 2024-07-03 | Stop reason: HOSPADM

## 2024-07-02 RX ORDER — TRANEXAMIC ACID 10 MG/ML
1 INJECTION, SOLUTION INTRAVENOUS EVERY 30 MIN PRN
Status: DISCONTINUED | OUTPATIENT
Start: 2024-07-02 | End: 2024-07-02 | Stop reason: HOSPADM

## 2024-07-02 RX ORDER — LOPERAMIDE HCL 2 MG
4 CAPSULE ORAL
Status: DISCONTINUED | OUTPATIENT
Start: 2024-07-02 | End: 2024-07-02 | Stop reason: HOSPADM

## 2024-07-02 RX ORDER — METOCLOPRAMIDE 10 MG/1
10 TABLET ORAL EVERY 6 HOURS PRN
Status: CANCELLED | OUTPATIENT
Start: 2024-07-02

## 2024-07-02 RX ORDER — OXYTOCIN/0.9 % SODIUM CHLORIDE 30/500 ML
340 PLASTIC BAG, INJECTION (ML) INTRAVENOUS CONTINUOUS PRN
Status: CANCELLED | OUTPATIENT
Start: 2024-07-02

## 2024-07-02 RX ORDER — ONDANSETRON 4 MG/1
4 TABLET, ORALLY DISINTEGRATING ORAL EVERY 6 HOURS PRN
Status: DISCONTINUED | OUTPATIENT
Start: 2024-07-02 | End: 2024-07-02 | Stop reason: HOSPADM

## 2024-07-02 RX ORDER — FENTANYL/ROPIVACAINE/NS/PF 2MCG/ML-.1
PLASTIC BAG, INJECTION (ML) EPIDURAL
Status: DISCONTINUED | OUTPATIENT
Start: 2024-07-02 | End: 2024-07-02 | Stop reason: HOSPADM

## 2024-07-02 RX ORDER — LIDOCAINE 40 MG/G
CREAM TOPICAL
Status: DISCONTINUED | OUTPATIENT
Start: 2024-07-02 | End: 2024-07-02 | Stop reason: HOSPADM

## 2024-07-02 RX ORDER — LOPERAMIDE HCL 2 MG
2 CAPSULE ORAL
Status: DISCONTINUED | OUTPATIENT
Start: 2024-07-02 | End: 2024-07-03 | Stop reason: HOSPADM

## 2024-07-02 RX ORDER — METOCLOPRAMIDE 10 MG/1
10 TABLET ORAL EVERY 6 HOURS PRN
Status: DISCONTINUED | OUTPATIENT
Start: 2024-07-02 | End: 2024-07-02 | Stop reason: HOSPADM

## 2024-07-02 RX ORDER — ENOXAPARIN SODIUM 100 MG/ML
40 INJECTION SUBCUTANEOUS EVERY 24 HOURS
Status: DISCONTINUED | OUTPATIENT
Start: 2024-07-02 | End: 2024-07-03 | Stop reason: HOSPADM

## 2024-07-02 RX ORDER — ONDANSETRON 2 MG/ML
4 INJECTION INTRAMUSCULAR; INTRAVENOUS EVERY 6 HOURS PRN
Status: DISCONTINUED | OUTPATIENT
Start: 2024-07-02 | End: 2024-07-02 | Stop reason: HOSPADM

## 2024-07-02 RX ORDER — LOPERAMIDE HCL 2 MG
4 CAPSULE ORAL
Status: CANCELLED | OUTPATIENT
Start: 2024-07-02

## 2024-07-02 RX ORDER — CITRIC ACID/SODIUM CITRATE 334-500MG
30 SOLUTION, ORAL ORAL ONCE
Status: DISCONTINUED | OUTPATIENT
Start: 2024-07-02 | End: 2024-07-02 | Stop reason: HOSPADM

## 2024-07-02 RX ORDER — METHYLERGONOVINE MALEATE 0.2 MG/ML
200 INJECTION INTRAVENOUS
Status: CANCELLED | OUTPATIENT
Start: 2024-07-02

## 2024-07-02 RX ORDER — LIDOCAINE HYDROCHLORIDE 10 MG/ML
INJECTION, SOLUTION EPIDURAL; INFILTRATION; INTRACAUDAL; PERINEURAL
Status: DISCONTINUED
Start: 2024-07-02 | End: 2024-07-02 | Stop reason: HOSPADM

## 2024-07-02 RX ORDER — TRANEXAMIC ACID 10 MG/ML
1 INJECTION, SOLUTION INTRAVENOUS EVERY 30 MIN PRN
Status: DISCONTINUED | OUTPATIENT
Start: 2024-07-02 | End: 2024-07-03 | Stop reason: HOSPADM

## 2024-07-02 RX ORDER — BISACODYL 10 MG
10 SUPPOSITORY, RECTAL RECTAL DAILY PRN
Status: DISCONTINUED | OUTPATIENT
Start: 2024-07-02 | End: 2024-07-03 | Stop reason: HOSPADM

## 2024-07-02 RX ORDER — CARBOPROST TROMETHAMINE 250 UG/ML
250 INJECTION, SOLUTION INTRAMUSCULAR
Status: DISCONTINUED | OUTPATIENT
Start: 2024-07-02 | End: 2024-07-02 | Stop reason: HOSPADM

## 2024-07-02 RX ORDER — FENTANYL CITRATE 50 UG/ML
50-100 INJECTION, SOLUTION INTRAMUSCULAR; INTRAVENOUS ONCE
Status: COMPLETED | OUTPATIENT
Start: 2024-07-02 | End: 2024-07-02

## 2024-07-02 RX ORDER — DOCUSATE SODIUM 100 MG/1
100 CAPSULE, LIQUID FILLED ORAL DAILY
Status: DISCONTINUED | OUTPATIENT
Start: 2024-07-02 | End: 2024-07-03 | Stop reason: HOSPADM

## 2024-07-02 RX ORDER — OXYTOCIN 10 [USP'U]/ML
10 INJECTION, SOLUTION INTRAMUSCULAR; INTRAVENOUS
Status: DISCONTINUED | OUTPATIENT
Start: 2024-07-02 | End: 2024-07-03 | Stop reason: HOSPADM

## 2024-07-02 RX ORDER — SODIUM CHLORIDE, SODIUM LACTATE, POTASSIUM CHLORIDE, CALCIUM CHLORIDE 600; 310; 30; 20 MG/100ML; MG/100ML; MG/100ML; MG/100ML
INJECTION, SOLUTION INTRAVENOUS CONTINUOUS
Status: DISCONTINUED | OUTPATIENT
Start: 2024-07-02 | End: 2024-07-02 | Stop reason: HOSPADM

## 2024-07-02 RX ORDER — PROCHLORPERAZINE MALEATE 10 MG
10 TABLET ORAL EVERY 6 HOURS PRN
Status: DISCONTINUED | OUTPATIENT
Start: 2024-07-02 | End: 2024-07-02 | Stop reason: HOSPADM

## 2024-07-02 RX ORDER — CARBOPROST TROMETHAMINE 250 UG/ML
250 INJECTION, SOLUTION INTRAMUSCULAR
Status: DISCONTINUED | OUTPATIENT
Start: 2024-07-02 | End: 2024-07-03 | Stop reason: HOSPADM

## 2024-07-02 RX ORDER — ONDANSETRON 4 MG/1
4 TABLET, ORALLY DISINTEGRATING ORAL EVERY 6 HOURS PRN
Status: CANCELLED | OUTPATIENT
Start: 2024-07-02

## 2024-07-02 RX ORDER — LOPERAMIDE HCL 2 MG
4 CAPSULE ORAL
Status: DISCONTINUED | OUTPATIENT
Start: 2024-07-02 | End: 2024-07-03 | Stop reason: HOSPADM

## 2024-07-02 RX ORDER — MISOPROSTOL 200 UG/1
800 TABLET ORAL
Status: DISCONTINUED | OUTPATIENT
Start: 2024-07-02 | End: 2024-07-03 | Stop reason: HOSPADM

## 2024-07-02 RX ORDER — ONDANSETRON 2 MG/ML
4 INJECTION INTRAMUSCULAR; INTRAVENOUS EVERY 6 HOURS PRN
Status: CANCELLED | OUTPATIENT
Start: 2024-07-02

## 2024-07-02 RX ORDER — MISOPROSTOL 200 UG/1
800 TABLET ORAL
Status: CANCELLED | OUTPATIENT
Start: 2024-07-02

## 2024-07-02 RX ORDER — MISOPROSTOL 200 UG/1
TABLET ORAL
Status: COMPLETED
Start: 2024-07-02 | End: 2024-07-02

## 2024-07-02 RX ORDER — PROCHLORPERAZINE 25 MG
25 SUPPOSITORY, RECTAL RECTAL EVERY 12 HOURS PRN
Status: CANCELLED | OUTPATIENT
Start: 2024-07-02

## 2024-07-02 RX ADMIN — MISOPROSTOL 400 MCG: 200 TABLET ORAL at 05:23

## 2024-07-02 RX ADMIN — Medication 340 ML/HR: at 06:57

## 2024-07-02 RX ADMIN — SODIUM CHLORIDE, POTASSIUM CHLORIDE, SODIUM LACTATE AND CALCIUM CHLORIDE 1000 ML: 600; 310; 30; 20 INJECTION, SOLUTION INTRAVENOUS at 04:30

## 2024-07-02 RX ADMIN — IBUPROFEN 800 MG: 800 TABLET, FILM COATED ORAL at 17:34

## 2024-07-02 RX ADMIN — FENTANYL CITRATE 100 MCG: 50 INJECTION INTRAMUSCULAR; INTRAVENOUS at 04:24

## 2024-07-02 RX ADMIN — IBUPROFEN 800 MG: 800 TABLET, FILM COATED ORAL at 10:24

## 2024-07-02 RX ADMIN — ACETAMINOPHEN 650 MG: 325 TABLET, FILM COATED ORAL at 22:42

## 2024-07-02 RX ADMIN — Medication 340 ML/HR: at 05:12

## 2024-07-02 RX ADMIN — ENOXAPARIN SODIUM 40 MG: 40 INJECTION SUBCUTANEOUS at 21:01

## 2024-07-02 RX ADMIN — CEFAZOLIN SODIUM 2 G: 2 INJECTION, SOLUTION INTRAVENOUS at 06:35

## 2024-07-02 RX ADMIN — METHYLERGONOVINE MALEATE 200 MCG: 0.2 INJECTION INTRAVENOUS at 05:41

## 2024-07-02 RX ADMIN — ACETAMINOPHEN 650 MG: 325 TABLET, FILM COATED ORAL at 14:54

## 2024-07-02 RX ADMIN — DOCUSATE SODIUM 100 MG: 100 CAPSULE, LIQUID FILLED ORAL at 17:34

## 2024-07-02 RX ADMIN — FENTANYL CITRATE 100 MCG: 50 INJECTION INTRAMUSCULAR; INTRAVENOUS at 05:45

## 2024-07-02 ASSESSMENT — ACTIVITIES OF DAILY LIVING (ADL)
ADLS_ACUITY_SCORE: 18
ADLS_ACUITY_SCORE: 19
ADLS_ACUITY_SCORE: 35
ADLS_ACUITY_SCORE: 19
ADLS_ACUITY_SCORE: 18
ADLS_ACUITY_SCORE: 19
ADLS_ACUITY_SCORE: 18
ADLS_ACUITY_SCORE: 19

## 2024-07-02 NOTE — PROGRESS NOTES
Brief Progress Note    Presented to patient room as bedside nurse endorsed >1000mL out with fundal checks since time of delivery. On presentation, patient with large clots present at vaginal introitus with active bleeding around clots. Methergine administered and consent obtained to perform manual uterine sweep. Patient was given 100mcg of fentanyl and consented to uterine sweep and Amelia placement. Large clots present in lower uterine segment removed and Amelia device placed with 120cc of saline placed in cervical balloon. Amelia placed to wall suction at 80mmHg with significant improvement in vaginal bleeding. No further gushes identified on fundal examination. STAT COAGs and a CBC obtained and 2g of Ancef administered given intrauterine sweep. Will repeat COAGs and a Hgb later this morning and sign patient out to oncoming day team.     Ansley Davalos MD  Obstetrics and Gynecology, PGY-2  07/02/24 8:12 AM    I was present for above Amelia placement. Vitals stable and will check labs. Pt questions answered and tolerated well with IV fentanyl    Tanvi Blanchard MD

## 2024-07-02 NOTE — PLAN OF CARE
Vaginal Delivery Note   of viable female with Dr. Blanchard in attendance.  Nursery RN Kathy BOATENG present. Infant with spontaneous cry, to mother's abdomen, dried and stimulated.  APGAR at 1 minute:  7 and APGAR at 5 minutes:  9.  Placenta delivered with out complication, 1st laceration, with repair, jean cares provided.  Provider called back to bedside at 0540 for excessive bleeding, methergine given (see MAR), ROSELINE placed. Mother and baby in stable condition at this time.

## 2024-07-02 NOTE — PROVIDER NOTIFICATION
07/02/24 1340   Provider Notification   Provider Name/Title Dr eason   Method of Notification Electronic Page     Notified 0845 hgb 9.6 and plt 194.

## 2024-07-02 NOTE — PROGRESS NOTES
"Summary:  pt arrived into [facility] at 1000 via wheelchair, holding NB with cousin.  Bedside report/NB ID bands checked/matched with GALA Robins.  Pt reported cramping/vaginal discomfort.  Reviewed prn meds and comfort items.  Pt requesting motrin and heat - given.  Reported \"2\" after.  VSS.  Assisted with void x 2 since arrival.  Assessments WNL.  No dizziness reported.  Mom formula feeding.  Cousin is here for support.  Call light is within reach for assistance.  See prev notes.  QBL prior to arrival approx 1800.  PPH with mariluz removed at 09.  FFU/1.  With scant flow.  Female cousin at bedside for support.  Orientated to room/routines.  Encouraged to void.  Voiding without difficulty.  Given ROP - per Chrissy - spouse in [place] - culturally .    "

## 2024-07-02 NOTE — L&D DELIVERY NOTE
L&D Delivery Note:     Chrissy Hernandez is a 34 year old now  who presented at 39w4d in active labor following SROM.  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 She progressed to complete, pushed with good maternal effort, and had a spontaneous vaginal delivery of a viable female infant in ALEK position on 24 at 0508.  Tight nuchal cord x2 was noted and reduced prior to delivery. Apgars and weight pending. The cord was double clamped after 60 seconds and cut.  A cord segment and cord blood were obtained.  30U of IV pitocin was started. The placenta was then delivered using gentle traction and suprapubic pressure. The uterus was noted to be firm after fundal massage, however, given a slight trickle with each fundal massage 400mcg of buccal misoprostol was administered. The perineum was assessed for lacerations revealing a first degree perineal laceration that was repaired in standard fashion using 3-0 Vicryl suture after lidocaine infiltration. On final examination of the perineum, the repair was noted to be hemostatic. Total QBL was 50 mL. The placenta appeared intact with a 3V umbilical cord. Dr. Espino was present for the entire procedure.     Ansley Davalos MD  OB/GYN PGY-2  24 5:28 AM    Physician Attestation  I was present for the entire delivery, including baby and placenta as well as perineal laceration repair. Successful     Tanvi Blanchard MD  Date of Service (when I saw the patient): 24

## 2024-07-02 NOTE — PROGRESS NOTES
At 0940, phone report from too jim on mom and NB.  PPH, had mariluz removed.  FFUU.  Had voided x1 for 100 ml.  Bottle feeding.  No pain meds given expect for fentanyl.  P11 w h/o of PPH and PE.

## 2024-07-02 NOTE — PLAN OF CARE
Patient is stable, conscious and coherent. Vitals are WNL. S/P  with female baby at 0508, with first degree laceration repaired. Vaginal bleeding is minimal, uterus is well contracted. Total QBL is 1798 ml. Patient had an episode of PPH after delivery, treated with uterotonics drugs and ROSELINE. Patient passed urine at 0920 with assistance. Slight dizziness noted while upright walking.   Handover given to GALA Teague.   Transferred patient to postpartum turcios at 0950 with the baby.

## 2024-07-02 NOTE — PROGRESS NOTES
[facility]   Post-partum Progress Note    Name:  Chrissy Hernandez  MRN: [id number]    S: Patient is doing well this morning. Pain is well controlled. Tolerating regular diet without nausea or vomiting.  Ambulating without dizziness or lightheadedness. Lochia minimal. Breast feeding. Passing flatus. Voiding spontaneously. Plans discharge home today.    O:   Patient Vitals for the past 24 hrs:   BP Temp Temp src Pulse Resp   24 0235 108/62 98.6  F (37  C) Oral 68 18   24 2232 106/75 98.5  F (36.9  C) Oral 53 16   24 1830 111/70 99  F (37.2  C) Oral 83 16   24 1430 108/64 98.7  F (37.1  C) Oral 86 16   24 1002 115/58 99.1  F (37.3  C) Oral 94 16   24 0846 123/59 -- -- -- --     Gen:  Resting comfortably, NAD  CV:  RR, well perfused  Pulm:  Non-labored breathing on room air  Abd:  Soft, appropriately ttp, non-distended.Fundus at umbilicus, firm and non-tender.   Ext:  non-tender, trace edema to bilateral lower extremities    I/O last 3 completed shifts:  In: 500 [P.O.:500]  Out: 935 [Urine:800; Blood:135]    Hgb:   Hemoglobin   Date Value Ref Range Status   2024 8.3 (L) 11.7 - 15.7 g/dL Final       Assessment/Plan:  Chrissy Hernandez 34 year old  on PPD #1 s/p .    # Postpartum management  # Postpartum hemorrhage   Pain: Well-controlled with ibuprofen, tylenol PRN   GI:  PRN bowel regimen, anti-emetics  : Voiding spontaneously  Hgb: Hgb 11.3 > EBL 50 + 1500 (miso, meth, sweep, Ancef, Amelia)+ 260 > 9.6 > 8.1 >8.3               -- s/p Amelia               -- coags wnl (0630)                -- Asymptomatic from acute blood loss anemia secondary to postpartum hemorrhage; will discharge with oral iron given Hgb <10.   Rh: Positive  Rubella: Immune  Feed: Breast feeding  Infant:  Stable at bedside  BC: Desires IUD placement at 6 week postpartum visit.  PPx:  Encourage ambulation, IS, SCDs while confined to bed    # Hx provoked PE  Diagnosed in 2018  after traveling to [facility]   - Will start 6 week course of ppx Lovenox    # Elevated Pressure x1  - One mild range blood pressure (7/2).  - Continue serial monitoring   - HELLP labs prn     Medically Ready for Discharge: Anticipated Today     Ansley Davalos MD  Obstetrics and Gynecology, PGY-2  07/03/24 8:40 AM    Patient seen and examined by me, agree with above resident note. Overall doing well and meeting goals.  Stable for discharge.  She has iron at home and will continue.  Instructions given.  Planning IUD for will do pp check in 8 weeks    DEMETRIUS POOLE MD

## 2024-07-02 NOTE — PROGRESS NOTES
Brief progress note     S: Patient states she is doing okay, just feeling tired.     BP (!) 143/56   Temp 98.6  F (37  C) (Oral)   Resp 16   LMP 2023   SpO2 100%   Breastfeeding Unknown   Abdomen: Soft, non-tender, non-distended. Uterus fundus firm, at the level of the umbilicus.   Pelvic: Amelia removed. No bleeding noted with fundal pressure.     AP: Chrissy Hernandez is a  s/p PPD#0  c/b by postpartum hemorrhage requiring Amelia placement. Amelia was removed from vacuum suction and 120ccs of fluid removed from balloon. Reassessment after 30 minutes of Amelia being off suction revealed no additional bleeding.  Amelia removed. Blood in the canister at 260cc. Total EBL at 1800cc. Will monitor for an additional 30 minutes from Amelia removal and if bleeding stable, patient can transfer to post partum unit.   -follow up coags and cbc.   -Discussed monitoring vitals and will transfuse as needed if hgb below 7 or patient is symptomatica in the setting of hemorrhage.     Catrina Shni MD MPH  Obstetric & Gynecology, PGY-2  2024 , 9:18 AM        I was present for removal of Amelia and no further bleeding noted. Checking labs and discussed still may need blood transfusion depending on how she is feeling today and if Hgb drops <7. No questions and will transfer to pp floor.    Tanvi Blanchard MD

## 2024-07-02 NOTE — LETTER
[facility]  [address]  [facility] [address]  Phone: [phone]    July 3, 2024        Chrissy Hernandez  [address]   [place] [address]          To whom it may concern:    RE: Chrissy Lowe was admitted to [facility] hospital 7/1-7/3/24 for delivery of her child.  She desires to return to work in 2 weeks. Patient may return to work with the following:  No working or lifting restrictions    Please contact me for questions or concerns.      Sincerely,      DEMETRIUS POOLE MD

## 2024-07-02 NOTE — PROVIDER NOTIFICATION
07/02/24 0820   Provider Notification   Provider Name/Title DUTCH Shin MD   Method of Notification At Bedside   Request Evaluate in Person   Notification Reason Other;Status Update  (re-assessed the patient, Amelia deflated.)     MD at beside with a student. Re-assessed patient's status. Amelia deflated, plan to re-assess after 30 minutes.

## 2024-07-02 NOTE — H&P
[facility]  Labor & Delivery History and Physical   2024  Chrissy Hernandez  [id number]      HPI:   Chrissy Hernandez is a 34 year old  at 39w4d by LMP c/w 10w2d US who presents with LOF and painful contractions. Pregnancy complicated as below.    Reports SROM occurred approximately 30-40 minutes ago. Since then has been painfully surendra. No vaginal bleeding. Feels good fetal movement.     Pregnancy history notable for  delivery (G#1) in [place];  delivery for breech presentation (G#10) that was followed by successful  (G#11). Does have hx PPH x2 requiring blood transfusions afterwards. Denies history of asthma or hypertension     Pregnancy complicated by:  Hx provoked PE, neg w/up  Hx PTD (32 wks)   Grand multip   Hx CS x1 f/b    Hx DENYS 3     OBHX:   OB History    Para Term  AB Living   12 10 9 1 1 10   SAB IAB Ectopic Multiple Live Births   1 0 0 0 10      # Outcome Date GA Lbr Elvin/2nd Weight Sex Type Anes PTL Lv   12 Current            11 Term 10/15/22 38w5d 13:21 / 00:04 3.8 kg (8 lb 6 oz) M    KATIE      Name: SUNDEEP HERNANDEZ SOWDO      Apgar1: 8  Apgar5: 9   10 Term 21 38w3d  3.55 kg (7 lb 13.2 oz) F CS-Unspec   KATIE      Name: TRAVIS,BG SOWDO      Apgar1: 8  Apgar5: 9   9 Term 10/19/19 38w2d  3.25 kg (7 lb 2.6 oz) F Vag-Spont  N KATIE      Name: TRAVISBG SOWDO      Apgar1: 8  Apgar5: 9   8 Term 16 39w0d  3.775 kg (8 lb 5.2 oz) F Vag-Spont   KATIE      Name: TRAVIS,BG SOWDO      Apgar1: 8  Apgar5: 9   7 Term 01/05/15 39w6d  3.6 kg (7 lb 15 oz) M Vag-Spont   KATIE      Name: TRAVISBB (SOWDO)      Apgar1: 9  Apgar5: 9   6 Term 10/18/13 39w0d   M Vag-Spont   KATIE      Birth Comments: System Generated. Please review and update pregnancy details.   5 Term 12 38w0d   F Vag-Spont  N KATIE      Birth Comments: System Generated. Please review and update pregnancy details.   4 Term 11/03/10 39w0d   F Vag-Spont  N KATIE   3 Term 10/15/09 39w0d   F  Vag-Spont   KATIE   2 SAB 2007 10w0d       FD   1  10/16/06 32w0d  2 kg (4 lb 6.6 oz) M Vag-Spont   KATIE      Birth Comments: [place],  delivery       Past Medical History:   Diagnosis Date    Major depressive disorder with single episode, in full remission (H24) 2012    Formatting of this note might be different from the original. Overview: Postpartum onset, severe, , hospitalized Formatting of this note might be different from the original. Postpartum onset, severe fall , hospitalized Formatting of this note might be different from the original. Overview: Postpartum onset, severe, , hospitalized    Pulmonary embolism (H)     not pregnant travelling    Wrist tendonitis 2008       Past Surgical History:   Procedure Laterality Date    BIOPSY CERVICAL, LOCAL EXCISION, SINGLE/MULTIPLE      C/SECTION, CLASSICAL         Medications:   Current Facility-Administered Medications   Medication Dose Route Frequency Provider Last Rate Last Admin    carboprost (HEMABATE) injection 250 mcg  250 mcg Intramuscular Q15 Min PRN Kishore Elias MD        And    loperamide (IMODIUM) capsule 4 mg  4 mg Oral Once PRN Kishore Elias MD        lactated ringers BOLUS 1,000 mL  1,000 mL Intravenous Once PRN Kishore Elias MD        Or    lactated ringers BOLUS 500 mL  500 mL Intravenous Once PRN Kishore Elias MD        lactated ringers infusion             lactated ringers infusion   Intravenous Continuous Kishore Elias MD        lidocaine (LMX4) cream   Topical Q1H PRN Kishore Elias MD        lidocaine (PF) (XYLOCAINE) 1 % injection             lidocaine 1 % 0.1-1 mL  0.1-1 mL Other Q1H PRN Kishore Elias MD        loperamide (IMODIUM) capsule 2 mg  2 mg Oral Q2H PRN Kishore Elias MD        methylergonovine (METHERGINE) injection 200 mcg  200 mcg Intramuscular Q2H PRN Kishore Elias MD        metoclopramide (REGLAN) injection 10 mg  10 mg Intravenous Q6H PRN Kishore Elias MD        Or    metoclopramide (REGLAN)  tablet 10 mg  10 mg Oral Q6H PRN Kishore Elias MD        misoprostol (CYTOTEC) 200 MCG tablet             misoprostol (CYTOTEC) tablet 400 mcg  400 mcg Oral ONCE PRN REPEAT PER INSTRUCTIONS Kishore Elias MD        Or    misoprostol (CYTOTEC) tablet 800 mcg  800 mcg Rectal ONCE PRN REPEAT PER INSTRUCTIONS Kishore Elias MD        naloxone (NARCAN) injection 0.2 mg  0.2 mg Intravenous Q2 Min PRN Kishore Elias MD        Or    naloxone (NARCAN) injection 0.4 mg  0.4 mg Intravenous Q2 Min PRN Kishore Elias MD        Or    naloxone (NARCAN) injection 0.2 mg  0.2 mg Intramuscular Q2 Min PRN Kishore Elias MD        Or    naloxone (NARCAN) injection 0.4 mg  0.4 mg Intramuscular Q2 Min PRN Kishore Elias MD        ondansetron (ZOFRAN ODT) ODT tab 4 mg  4 mg Oral Q6H PRN Kishore Elias MD        Or    ondansetron (ZOFRAN) injection 4 mg  4 mg Intravenous Q6H PRN Kishore Elias MD        oxytocin (PITOCIN) 10 UNIT/ML injection             oxytocin (PITOCIN) 30 units in 500 mL 0.9% NaCl infusion  340 mL/hr Intravenous Continuous PRN Kishore Elias MD        oxytocin (PITOCIN) injection 10 Units  10 Units Intramuscular Once PRN Kishore Elias MD        oxytocin in 0.9% NaCl (PITOCIN) 30 units/500 mL infusion             prochlorperazine (COMPAZINE) injection 10 mg  10 mg Intravenous Q6H PRN Kishore Elias MD        Or    prochlorperazine (COMPAZINE) tablet 10 mg  10 mg Oral Q6H PRN Kishore Elias MD        Or    prochlorperazine (COMPAZINE) suppository 25 mg  25 mg Rectal Q12H PRN Kishore Elias MD        sodium chloride (PF) 0.9% PF flush 3 mL  3 mL Intracatheter Q8H Kishore Elias MD        sodium chloride (PF) 0.9% PF flush 3 mL  3 mL Intracatheter q1 min prn Kishore Elias MD        sodium citrate-citric acid (BICITRA) solution 30 mL  30 mL Oral Once PRN Kishore Elias MD        sodium citrate-citric acid (BICITRA) solution 30 mL  30 mL Oral Once Kishore Elias MD        tranexamic acid 1 g in 100 mL NS IV bag (premix)  1 g Intravenous Q30 Min PRN Curt,  MD Kishore           Allergies   Allergen Reactions    Egg Solids, Whole Itching       Family History   Problem Relation Age of Onset    Diabetes Mother        SocialHX:   Social History     Tobacco Use    Smoking status: Never     Passive exposure: Never    Smokeless tobacco: Never   Vaping Use    Vaping status: Never Used   Substance Use Topics    Alcohol use: Never    Drug use: Never       ROS: 10-point ROS negative except as indicated in HPI.    Physical Exam:  Vitals:    07/02/24 0346   BP: 124/74   BP Location: Left arm   Patient Position: Semi-Mckeon's   Cuff Size: Adult Regular   Resp: 18   Temp: 98.6  F (37  C)   TempSrc: Oral     General: alert, oriented female, resting in bed in NAD  CV: regular rate and rhythm  Lungs: clear bilaterally, no crackles or wheezes.  Abdomen: soft, gravid, non-tender, EFW 7#.  Extremities: bilateral lower extremities non-tender with trace edema    SVE: 5/70/-1  Membranes: SROM around 0330    Presentation: Cephalic by BSUS    FHT  Baseline: 120  Variability: Moderate  Accels: Present  Decels: Absent  [place]: 2-3 in 10 minutes    Prenatal Labs:   Lab Results   Component Value Date    AS Negative 12/14/2023    HEPBANG Nonreactive 11/08/2023    CHPCRT  01/29/2008     Negative for C. trachomatis rRNA by transcription mediated amplification.   A negative result by transcription mediated amplification does not preclude the   presence of C. trachomatis infection because results are dependent on proper   and adequate collection, absence of inhibitors, and sufficient rRNA to be   detected.    GCPCRT  01/29/2008     Negative for N. gonorrhoeae rRNA by transcription mediated amplification.   A negative result by transcription mediated amplification does not preclude the   presence of N. gonorrhoeae infection because results are dependent on proper   and adequate collection, absence of inhibitors, and sufficient rRNA to be   detected.    HGB 9.8 (L) 06/14/2024       GBS Status: Negative  "    No results found for: \"PAP\"    Labs:   Results for orders placed or performed during the hospital encounter of 24 (from the past 24 hour(s))   ABO/Rh type and screen    Narrative    The following orders were created for panel order ABO/Rh type and screen.  Procedure                               Abnormality         Status                     ---------                               -----------         ------                     Adult Type and Screen[[id number]]                                                         Please view results for these tests on the individual orders.       A/P:   34 year old  at 39w4d by LMP c/w 10w2d US who presents after SROM. Pregnancy complicated by above listed concerns     Spontaneous Labor   Spontaneous Rupture of Membranes   Grand Mulitparous   - Admit to labor and delivery for further management  - SVE: /-1  - Pain: Desires epidural. Will bridge with fentanyl   - GBS negative; PCN not indicated  - Continue to monitor closely. Will start pitocin augmentation if contractions space  - Will T&C x2 and place IV access x2 due to grand multiparous state.   - Will have all uterotonic available at time of delivery     Hx Provoked PE   Hx of provoked PE in 2018 on a long airplane trip to [facility]. Symptoms initially bilateral lower extremity pain and swelling that progressed to pleurisy, palpitations, and dyspnea. Follow [place] CT in the [place] revealed small right lower lobe pulmonary embolism. Treated with Xarelto for 3 months. Follow up imaging negative for residual thrombus. She was not pregnant or on estrogen based therapies when this occurred. Has had 3 pregnancies since the initial event without any additional PE. Only took anticoagulants 6 weeks postpartum. Did take short course of anticoagulants this pregnancy during her trip to [facility] earlier this year.   - Acquired thrombophilic workup negative   - Plan to start 6 wk lovenox in postpartum period     #Fetal Well Being  - " Category I FHT  - Continuous EFM  - Interventions PRN  - EFW 7#    #PNC:     - BMI 31  - Rh positive, Rubella immune  - Elevated GCT, GTT nl  - Hep B Antigen neg  - GBS neg  - Other infectious labs neg  - Placenta:   - Immunization: s/p TDAP and flu  - Hx DENYS 3; plan for pap and colposcopy in postpartum period  - Feed: Discuss postpartum   - BC: Desires IUD    Patient care plan discussed under supervision of Dr. Blanchard.    Kishore Elias MD, MPH, MS  Obstetrics, Gynecology & Women's Health   Resident, PGY-3  2024 4:09 AM       Physician Attestation   I personally examined and evaluated this patient.  I discussed the patient with the resident/fellow and care team, and agree with the assessment and plan of care as documented in the note.     Key findings: NST reactive. Grand multip here in active labor with SROM, GBS neg. Trying to get epidural for pain control but also progressing rapidly. Anticipate .    YONG BLANCHARD MD  Date of Service (when I saw the patient): 24

## 2024-07-03 VITALS
HEART RATE: 71 BPM | BODY MASS INDEX: 29.63 KG/M2 | DIASTOLIC BLOOD PRESSURE: 58 MMHG | TEMPERATURE: 98.2 F | RESPIRATION RATE: 16 BRPM | OXYGEN SATURATION: 100 % | SYSTOLIC BLOOD PRESSURE: 110 MMHG | WEIGHT: 178.03 LBS

## 2024-07-03 LAB — HGB BLD-MCNC: 8.3 G/DL (ref 11.7–15.7)

## 2024-07-03 PROCEDURE — 85018 HEMOGLOBIN: CPT

## 2024-07-03 PROCEDURE — 250N000013 HC RX MED GY IP 250 OP 250 PS 637

## 2024-07-03 PROCEDURE — 36415 COLL VENOUS BLD VENIPUNCTURE: CPT

## 2024-07-03 RX ORDER — FERROUS SULFATE 325(65) MG
325 TABLET ORAL EVERY OTHER DAY
Qty: 60 TABLET | Refills: 0 | Status: SHIPPED | OUTPATIENT
Start: 2024-07-03

## 2024-07-03 RX ORDER — ACETAMINOPHEN 325 MG/1
650 TABLET ORAL EVERY 6 HOURS PRN
Qty: 100 TABLET | Refills: 0 | Status: SHIPPED | OUTPATIENT
Start: 2024-07-03

## 2024-07-03 RX ORDER — IBUPROFEN 600 MG/1
600 TABLET, FILM COATED ORAL EVERY 6 HOURS PRN
Qty: 60 TABLET | Refills: 0 | Status: SHIPPED | OUTPATIENT
Start: 2024-07-03

## 2024-07-03 RX ORDER — AMOXICILLIN 250 MG
1 CAPSULE ORAL DAILY
Qty: 100 TABLET | Refills: 0 | Status: SHIPPED | OUTPATIENT
Start: 2024-07-03

## 2024-07-03 RX ORDER — ENOXAPARIN SODIUM 100 MG/ML
40 INJECTION SUBCUTANEOUS EVERY 24 HOURS
Qty: 16.8 ML | Refills: 0 | Status: SHIPPED | OUTPATIENT
Start: 2024-07-03 | End: 2024-08-14

## 2024-07-03 RX ADMIN — ACETAMINOPHEN 650 MG: 325 TABLET, FILM COATED ORAL at 09:34

## 2024-07-03 RX ADMIN — IBUPROFEN 800 MG: 800 TABLET, FILM COATED ORAL at 02:38

## 2024-07-03 RX ADMIN — DOCUSATE SODIUM 100 MG: 100 CAPSULE, LIQUID FILLED ORAL at 09:35

## 2024-07-03 ASSESSMENT — ACTIVITIES OF DAILY LIVING (ADL)
ADLS_ACUITY_SCORE: 18

## 2024-07-03 NOTE — PLAN OF CARE
Data: Vital signs within normal limits. Postpartum checks within normal limits. Patient eating and drinking normally. Patient able to empty bladder independently and is up ambulating. No apparent signs of infection. Bottle feeding formula on demand. Patient performing self cares and is able to care for infant.  Action: Patient medicated during the shift for uterine cramping. Patient reassessed within 1 hour after each medication and pain was improved - patient stated she was comfortable. Patient education done about non-pharm alternatives. Using hot packs.   Response: Positive attachment behaviors observed with infant. No support person overnight..   Plan: Continue with education and plan of care.     Goal Outcome Evaluation:  Problem: Labor  Goal: Normal Uterine Contraction Pattern  Intervention: Monitor and Manage Uterine Activity  Recent Flowsheet Documentation  Taken 7/3/2024 0235 by Mayelin Arango RN  Fluid/Electrolyte Management: fluids provided     Problem: Postpartum (Vaginal Delivery)  Goal: Effective Urinary Elimination  Outcome: Progressing     Problem: Postpartum (Vaginal Delivery)  Goal: Optimal Pain Control and Function  Outcome: Progressing  Intervention: Prevent or Manage Pain  Recent Flowsheet Documentation  Taken 7/3/2024 0238 by Mayelin Arango, RN  Pain Management Interventions: medication (see MAR)  Taken 7/2/2024 2242 by Mayelin Arango, RN  Pain Management Interventions: medication (see MAR)

## 2024-07-03 NOTE — PROGRESS NOTES
Summary:  Chrissy is comfortable with prn tyl/motrin.  VSS.  Assessments WNL.  Voiding and ambulating without difficulty.  Bottle feeding NB.  Requesting Medulla pump at discharge - plans to br feed/pump after home.  EDS completed - 0; work letter from Dr Sidhu - printed and given to pt.  Seen by Anusha, registrar re: BC.  BC completed - ROP not needed.  Pt states readiness for discharge to home later today.

## 2024-07-03 NOTE — DISCHARGE SUMMARY
[facility] Discharge Summary    Patient: Chrissy Hernandez    YOB: 1990  MRN# [id number]      Date of Admission:  2024  Date of Discharge::  7/3/2024  Admitting Physician:  Laura Culp MD  Discharge Physician:  uYsra Sidhu MD           Admission Diagnoses:   IUP at 39w4d  Hx provoked PE, neg w/up  Hx PTD (32 wks)   Grand multip   Hx CS x1 f/b    Hx DENYS 3           Discharge Diagnosis:   IUP at 39w4d, now delivered  Post partum hemorrhage  Acute blood loss anemia         Procedures:     Procedure(s): -            Medications Prior to Admission:     Medications Prior to Admission   Medication Sig Dispense Refill Last Dose    cyanocobalamin (VITAMIN B-12) 1000 MCG tablet Take 1 tablet (1,000 mcg) by mouth daily 90 tablet 1 2024    vitamin C (ASCORBIC ACID) 250 MG tablet Take 1 tablet (250 mg) by mouth daily 90 tablet 1 2024    omeprazole (PRILOSEC OTC) 20 MG EC tablet Take 1 tablet (20 mg) by mouth 2 times daily 60 tablet 3     ondansetron (ZOFRAN ODT) 4 MG ODT tab Take 1 tablet (4 mg) by mouth every 8 hours as needed for nausea 30 tablet 3     [DISCONTINUED] doxylamine (UNISOM SLEEPTABS) 25 MG TABS tablet Take 0.5 tablets (12.5 mg) by mouth at bedtime (Patient not taking: Reported on 2024) 90 tablet 3     [DISCONTINUED] enoxaparin ANTICOAGULANT (LOVENOX) 40 MG/0.4ML syringe Inject 0.4 mLs (40 mg) Subcutaneous daily as needed (prior to long distance travel) (Patient not taking: Reported on 2024) 4.8 mL 0     [DISCONTINUED] ferrous gluconate (FERGON) 324 (38 Fe) MG tablet Take 1 tablet (324 mg) by mouth daily (with breakfast) (Patient not taking: Reported on 2024) 90 tablet 1     [DISCONTINUED] pyridOXINE (VITAMIN B6) 25 MG tablet Take 1 tablet (25 mg) by mouth 3 times daily (Patient not taking: Reported on 2024) 90 tablet 3              Discharge Medications:        Review of your medicines        START taking         Dose / Directions   acetaminophen 325 MG tablet  Commonly known as: TYLENOL  Used for: Vaginal birth after  ()      Dose: 650 mg  Take 2 tablets (650 mg) by mouth every 6 hours as needed for mild pain Start after Delivery.  Quantity: 100 tablet  Refills: 0     ferrous sulfate 325 (65 Fe) MG tablet  Commonly known as: FEROSUL  Used for: Vaginal birth after  (), Anemia due to blood loss, acute      Dose: 325 mg  Take 1 tablet (325 mg) by mouth every other day  Quantity: 60 tablet  Refills: 0     ibuprofen 600 MG tablet  Commonly known as: ADVIL/MOTRIN  Used for: Vaginal birth after  ()      Dose: 600 mg  Take 1 tablet (600 mg) by mouth every 6 hours as needed for moderate pain Start after delivery  Quantity: 60 tablet  Refills: 0     senna-docusate 8.6-50 MG tablet  Commonly known as: SENOKOT-S/PERICOLACE  Used for: Vaginal birth after  (), Anemia due to blood loss, acute      Dose: 1 tablet  Take 1 tablet by mouth daily Start after delivery.  Quantity: 100 tablet  Refills: 0            CONTINUE these medicines which may have CHANGED, or have new prescriptions. If we are uncertain of the size of tablets/capsules you have at home, strength may be listed as something that might have changed.        Dose / Directions   enoxaparin ANTICOAGULANT 40 MG/0.4ML syringe  Commonly known as: LOVENOX  This may have changed:   when to take this  reasons to take this  Used for: History of pulmonary embolus (PE)      Dose: 40 mg  Inject 0.4 mLs (40 mg) Subcutaneous every 24 hours for 42 days  Quantity: 16.8 mL  Refills: 0            CONTINUE these medicines which have NOT CHANGED        Dose / Directions   cyanocobalamin 1000 MCG tablet  Commonly known as: VITAMIN B-12  Used for: Anemia in pregnancy, third trimester      Dose: 1,000 mcg  Take 1 tablet (1,000 mcg) by mouth daily  Quantity: 90 tablet  Refills: 1     omeprazole 20 MG EC tablet  Commonly known as: PriLOSEC OTC  Used for:  Encounter for supervision of high risk multigravida of advanced maternal age, antepartum      Dose: 20 mg  Take 1 tablet (20 mg) by mouth 2 times daily  Quantity: 60 tablet  Refills: 3     ondansetron 4 MG ODT tab  Commonly known as: ZOFRAN ODT  Used for: Encounter for supervision of high risk multigravida of advanced maternal age, antepartum      Dose: 4 mg  Take 1 tablet (4 mg) by mouth every 8 hours as needed for nausea  Quantity: 30 tablet  Refills: 3     vitamin C 250 MG tablet  Commonly known as: ASCORBIC ACID  Used for: Anemia in pregnancy, third trimester      Dose: 250 mg  Take 1 tablet (250 mg) by mouth daily  Quantity: 90 tablet  Refills: 1            STOP taking      doxylamine 25 MG Tabs tablet  Commonly known as: Unisom SleepTabs        ferrous gluconate 324 (38 Fe) MG tablet  Commonly known as: FERGON        pyridOXINE 25 MG tablet  Commonly known as: VITAMIN B6                  Where to get your medicines        These medications were sent to [facility] [address]  [address] [address]      Phone: [phone]   acetaminophen 325 MG tablet  enoxaparin ANTICOAGULANT 40 MG/0.4ML syringe  ferrous sulfate 325 (65 Fe) MG tablet  ibuprofen 600 MG tablet  senna-docusate 8.6-50 MG tablet               Consultations:             Brief Admission History   34 year old now  who presents after SROM in spontaneous labor          Brief Intrapartum Course:   Chrissy Hernandez is a 34 year old now  who presented at 39w4d in active labor following SROM.  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 She progressed to complete, pushed with good maternal effort, and had a spontaneous vaginal delivery of a viable female infant in ALEK position on 24 at 0508.  Tight nuchal cord x2 was noted  and reduced prior to delivery. Apgars and weight pending. The cord was double clamped after 60 seconds and cut.  A cord segment and cord blood were obtained.  30U of IV pitocin was started. The placenta was then delivered using gentle traction and suprapubic pressure. The uterus was noted to be firm after fundal massage, however, given a slight trickle with each fundal massage 400mcg of buccal misoprostol was administered. The perineum was assessed for lacerations revealing a first degree perineal laceration that was repaired in standard fashion using 3-0 Vicryl suture after lidocaine infiltration. On final examination of the perineum, the repair was noted to be hemostatic. Total QBL was 50 mL. The placenta appeared intact with a 3V umbilical cord. Dr. Espino was present for the entire procedure.     Shortly after delivery, patient experienced significant post partum hemorrhage secondary to uterine atony. She received misoprostol, methergine, uterine sweep and had a Amelia placement. She had a total EBL of 1800. She also received ancef for antibiotic prophylaxis.  Coags were within normal limits. Amelia was removed and patient's bleeding had stopped and she was stable for transfer to the  post partum floor.            Hospital Course:   The patient's hospital course was unremarkable. On discharge, her pain was well controlled. Vaginal bleeding is similar to peak menstrual flow. Voiding without difficulty.  Ambulating well and tolerating a normal diet. Breastfeeding well.  Infant is stable. She was discharged on post-partum day #1.    Post-partum hemoglobin: 8.1. she was sent home on iron pills.   Contraception: Desires mirena IUD placement at 6 weeks post partum  visit  Rh positive status, rhogam was not indicated  She was sent home on lovenox prophylaxis for 6 weeks post partum period given history of DVT           Discharge Instructions and Follow-Up:     Discharge diet: Regular   Discharge activity: Pelvic rest for  6 weeks including no sexual intercourse, tampons, or douching.    Discharge follow-up: Follow up with your primary OB for a routine postpartum visit in 6 weeks           Discharge Disposition:     Discharged to home in stable condition      Catrina Shin MD MPH  Obstetric & Gynecology, PGY-2  July 3, 2024 , 6:58 AM

## 2024-07-03 NOTE — PROGRESS NOTES
Chrissy discharged to home at 1417 in stable and comfortable condition with Fritz and her older children.  Letter for work written by Dr Sidhu given to pt.  Medulla breast pump given to pt.  Admission questions all answered.  Gave filled Rx and reviewed discharge meds - pt has been self administrating Lovenox and demo/teach backed how to give.  Reviewed ibuprophen may increase bleeding - sign/symptoms.  Pt states understanding of discharge meds.  States understanding of when to seek care, follow up and resources.  NB bands checked and matched at discharge.  Pt states belongings with her.  Home in stable condition.  Escorted via wheelchair by [facility] Ramachandran.

## 2024-09-24 ENCOUNTER — TELEPHONE (OUTPATIENT)
Dept: HEMATOLOGY | Facility: CLINIC | Age: 34
End: 2024-09-24
Payer: COMMERCIAL